# Patient Record
Sex: FEMALE | Race: WHITE | NOT HISPANIC OR LATINO | Employment: FULL TIME | URBAN - METROPOLITAN AREA
[De-identification: names, ages, dates, MRNs, and addresses within clinical notes are randomized per-mention and may not be internally consistent; named-entity substitution may affect disease eponyms.]

---

## 2017-08-16 ENCOUNTER — RECORDS - HEALTHEAST (OUTPATIENT)
Dept: LAB | Facility: CLINIC | Age: 47
End: 2017-08-16

## 2017-08-16 LAB
CHOLEST SERPL-MCNC: 220 MG/DL
FASTING STATUS PATIENT QL REPORTED: NO
HDLC SERPL-MCNC: 46 MG/DL
LDLC SERPL CALC-MCNC: 158 MG/DL
TRIGL SERPL-MCNC: 81 MG/DL

## 2017-08-21 LAB
HPV INTERPRETATION - HISTORICAL: NORMAL
HPV INTERPRETER - HISTORICAL: NORMAL

## 2017-08-24 LAB

## 2018-01-17 ENCOUNTER — RECORDS - HEALTHEAST (OUTPATIENT)
Dept: ADMINISTRATIVE | Facility: OTHER | Age: 48
End: 2018-01-17

## 2018-03-02 ENCOUNTER — RECORDS - HEALTHEAST (OUTPATIENT)
Dept: LAB | Facility: CLINIC | Age: 48
End: 2018-03-02

## 2018-03-02 LAB — TSH SERPL DL<=0.005 MIU/L-ACNC: 9.99 UIU/ML (ref 0.3–5)

## 2018-04-20 ENCOUNTER — RECORDS - HEALTHEAST (OUTPATIENT)
Dept: LAB | Facility: CLINIC | Age: 48
End: 2018-04-20

## 2018-04-20 LAB
T4 FREE SERPL-MCNC: 1.1 NG/DL (ref 0.7–1.8)
TSH SERPL DL<=0.005 MIU/L-ACNC: 7.68 UIU/ML (ref 0.3–5)

## 2018-05-09 ENCOUNTER — RECORDS - HEALTHEAST (OUTPATIENT)
Dept: LAB | Facility: CLINIC | Age: 48
End: 2018-05-09

## 2018-05-09 LAB
ANION GAP SERPL CALCULATED.3IONS-SCNC: 13 MMOL/L (ref 5–18)
BUN SERPL-MCNC: 16 MG/DL (ref 8–22)
C REACTIVE PROTEIN LHE: 1.6 MG/DL (ref 0–0.8)
CALCIUM SERPL-MCNC: 9.6 MG/DL (ref 8.5–10.5)
CHLORIDE BLD-SCNC: 105 MMOL/L (ref 98–107)
CK SERPL-CCNC: 64 U/L (ref 30–190)
CO2 SERPL-SCNC: 19 MMOL/L (ref 22–31)
CREAT SERPL-MCNC: 0.64 MG/DL (ref 0.6–1.1)
ERYTHROCYTE [SEDIMENTATION RATE] IN BLOOD BY WESTERGREN METHOD: 48 MM/HR (ref 0–20)
GFR SERPL CREATININE-BSD FRML MDRD: >60 ML/MIN/1.73M2
GLUCOSE BLD-MCNC: 81 MG/DL (ref 70–125)
POTASSIUM BLD-SCNC: 4.7 MMOL/L (ref 3.5–5)
RHEUMATOID FACT SERPL-ACNC: <15 IU/ML (ref 0–30)
SODIUM SERPL-SCNC: 137 MMOL/L (ref 136–145)

## 2018-05-10 LAB — ANA SER QL: 0.7 U

## 2018-05-29 ENCOUNTER — RECORDS - HEALTHEAST (OUTPATIENT)
Dept: LAB | Facility: CLINIC | Age: 48
End: 2018-05-29

## 2018-05-30 LAB — TSH SERPL DL<=0.005 MIU/L-ACNC: 0.54 UIU/ML (ref 0.3–5)

## 2019-01-11 ENCOUNTER — RECORDS - HEALTHEAST (OUTPATIENT)
Dept: LAB | Facility: CLINIC | Age: 49
End: 2019-01-11

## 2019-01-11 LAB
T4 FREE SERPL-MCNC: 1.3 NG/DL (ref 0.7–1.8)
TSH SERPL DL<=0.005 MIU/L-ACNC: 0.11 UIU/ML (ref 0.3–5)

## 2019-03-21 ENCOUNTER — RECORDS - HEALTHEAST (OUTPATIENT)
Dept: LAB | Facility: CLINIC | Age: 49
End: 2019-03-21

## 2019-03-22 LAB
T4 FREE SERPL-MCNC: 1.3 NG/DL (ref 0.7–1.8)
TSH SERPL DL<=0.005 MIU/L-ACNC: 0.12 UIU/ML (ref 0.3–5)

## 2019-05-17 ENCOUNTER — RECORDS - HEALTHEAST (OUTPATIENT)
Dept: LAB | Facility: CLINIC | Age: 49
End: 2019-05-17

## 2019-05-17 LAB
T4 FREE SERPL-MCNC: 1.3 NG/DL (ref 0.7–1.8)
TSH SERPL DL<=0.005 MIU/L-ACNC: 0.27 UIU/ML (ref 0.3–5)

## 2019-09-04 ENCOUNTER — RECORDS - HEALTHEAST (OUTPATIENT)
Dept: LAB | Facility: CLINIC | Age: 49
End: 2019-09-04

## 2019-09-04 LAB — TSH SERPL DL<=0.005 MIU/L-ACNC: 0.57 UIU/ML (ref 0.3–5)

## 2020-02-26 ENCOUNTER — RECORDS - HEALTHEAST (OUTPATIENT)
Dept: LAB | Facility: CLINIC | Age: 50
End: 2020-02-26

## 2020-02-26 LAB
CHOLEST SERPL-MCNC: 211 MG/DL
FASTING STATUS PATIENT QL REPORTED: ABNORMAL
HDLC SERPL-MCNC: 55 MG/DL
LDLC SERPL CALC-MCNC: 139 MG/DL
TRIGL SERPL-MCNC: 84 MG/DL
TSH SERPL DL<=0.005 MIU/L-ACNC: 2.17 UIU/ML (ref 0.3–5)

## 2020-09-30 ENCOUNTER — RECORDS - HEALTHEAST (OUTPATIENT)
Dept: ADMINISTRATIVE | Facility: OTHER | Age: 50
End: 2020-09-30

## 2020-10-02 ENCOUNTER — AMBULATORY - HEALTHEAST (OUTPATIENT)
Dept: ADMINISTRATIVE | Facility: CLINIC | Age: 50
End: 2020-10-02

## 2020-10-02 DIAGNOSIS — E66.01 MORBID OBESITY (H): ICD-10-CM

## 2020-10-02 DIAGNOSIS — E03.9 HYPOTHYROIDISM: ICD-10-CM

## 2020-10-27 ENCOUNTER — AMBULATORY - HEALTHEAST (OUTPATIENT)
Dept: SURGERY | Facility: CLINIC | Age: 50
End: 2020-10-27

## 2020-10-27 RX ORDER — LEVOTHYROXINE SODIUM 150 UG/1
112 TABLET ORAL DAILY
Status: SHIPPED | COMMUNITY
Start: 2020-10-27

## 2020-10-28 ENCOUNTER — AMBULATORY - HEALTHEAST (OUTPATIENT)
Dept: LAB | Facility: CLINIC | Age: 50
End: 2020-10-28

## 2020-10-28 ENCOUNTER — OFFICE VISIT - HEALTHEAST (OUTPATIENT)
Dept: SURGERY | Facility: CLINIC | Age: 50
End: 2020-10-28

## 2020-10-28 DIAGNOSIS — E66.01 MORBID OBESITY (H): ICD-10-CM

## 2020-10-28 DIAGNOSIS — Q89.3 HETEROTAXY SYNDROME: ICD-10-CM

## 2020-10-28 DIAGNOSIS — E03.8 OTHER SPECIFIED HYPOTHYROIDISM: ICD-10-CM

## 2020-10-28 LAB
ALBUMIN SERPL-MCNC: 3.4 G/DL (ref 3.5–5)
ALP SERPL-CCNC: 67 U/L (ref 45–120)
ALT SERPL W P-5'-P-CCNC: 13 U/L (ref 0–45)
ANION GAP SERPL CALCULATED.3IONS-SCNC: 8 MMOL/L (ref 5–18)
AST SERPL W P-5'-P-CCNC: 12 U/L (ref 0–40)
BILIRUB SERPL-MCNC: 0.2 MG/DL (ref 0–1)
BUN SERPL-MCNC: 10 MG/DL (ref 8–22)
CALCIUM SERPL-MCNC: 8.9 MG/DL (ref 8.5–10.5)
CHLORIDE BLD-SCNC: 106 MMOL/L (ref 98–107)
CO2 SERPL-SCNC: 23 MMOL/L (ref 22–31)
CREAT SERPL-MCNC: 0.63 MG/DL (ref 0.6–1.1)
ERYTHROCYTE [DISTWIDTH] IN BLOOD BY AUTOMATED COUNT: 12.7 % (ref 11–14.5)
FERRITIN SERPL-MCNC: 10 NG/ML (ref 10–130)
FOLATE SERPL-MCNC: 17.4 NG/ML
GFR SERPL CREATININE-BSD FRML MDRD: >60 ML/MIN/1.73M2
GLUCOSE BLD-MCNC: 92 MG/DL (ref 70–125)
HBA1C MFR BLD: 5.5 %
HCT VFR BLD AUTO: 36.5 % (ref 35–47)
HGB BLD-MCNC: 12 G/DL (ref 12–16)
MCH RBC QN AUTO: 26.4 PG (ref 27–34)
MCHC RBC AUTO-ENTMCNC: 32.9 G/DL (ref 32–36)
MCV RBC AUTO: 80 FL (ref 80–100)
PLATELET # BLD AUTO: 361 THOU/UL (ref 140–440)
PMV BLD AUTO: 9 FL (ref 7–10)
POTASSIUM BLD-SCNC: 4.4 MMOL/L (ref 3.5–5)
PROT SERPL-MCNC: 7.1 G/DL (ref 6–8)
PTH-INTACT SERPL-MCNC: 42 PG/ML (ref 10–86)
RBC # BLD AUTO: 4.55 MILL/UL (ref 3.8–5.4)
SODIUM SERPL-SCNC: 137 MMOL/L (ref 136–145)
VIT B12 SERPL-MCNC: 1005 PG/ML (ref 213–816)
WBC: 9.3 THOU/UL (ref 4–11)

## 2020-10-28 ASSESSMENT — MIFFLIN-ST. JEOR: SCORE: 1673.66

## 2020-10-29 LAB
25(OH)D3 SERPL-MCNC: 35.3 NG/ML (ref 30–80)
25(OH)D3 SERPL-MCNC: 35.3 NG/ML (ref 30–80)

## 2020-11-01 LAB — VIT B1 PYROPHOSHATE BLD-SCNC: 122 NMOL/L (ref 70–180)

## 2020-11-02 ENCOUNTER — AMBULATORY - HEALTHEAST (OUTPATIENT)
Dept: SURGERY | Facility: CLINIC | Age: 50
End: 2020-11-02

## 2020-11-03 LAB
ANNOTATION COMMENT IMP: NORMAL
VIT A SERPL-MCNC: 0.4 MG/L (ref 0.3–1.2)
VITAMIN A (RETINYL PALMITATE): <0.02 MG/L (ref 0–0.1)

## 2020-11-04 LAB — ZINC SERPL-MCNC: 52.8 UG/DL (ref 60–120)

## 2020-11-18 ENCOUNTER — OFFICE VISIT - HEALTHEAST (OUTPATIENT)
Dept: SURGERY | Facility: CLINIC | Age: 50
End: 2020-11-18

## 2020-11-18 DIAGNOSIS — E66.01 MORBID OBESITY (H): ICD-10-CM

## 2020-11-18 DIAGNOSIS — E66.01 MORBID OBESITY WITH BMI OF 40.0-44.9, ADULT (H): ICD-10-CM

## 2020-11-27 ENCOUNTER — RECORDS - HEALTHEAST (OUTPATIENT)
Dept: LAB | Facility: CLINIC | Age: 50
End: 2020-11-27

## 2020-11-27 LAB — TSH SERPL DL<=0.005 MIU/L-ACNC: 3.58 UIU/ML (ref 0.3–5)

## 2020-12-02 ENCOUNTER — OFFICE VISIT - HEALTHEAST (OUTPATIENT)
Dept: SURGERY | Facility: CLINIC | Age: 50
End: 2020-12-02

## 2020-12-02 DIAGNOSIS — E66.01 MORBID OBESITY (H): ICD-10-CM

## 2020-12-30 ENCOUNTER — OFFICE VISIT - HEALTHEAST (OUTPATIENT)
Dept: SURGERY | Facility: CLINIC | Age: 50
End: 2020-12-30

## 2020-12-30 DIAGNOSIS — E66.9 OBESITY (BMI 30-39.9): ICD-10-CM

## 2020-12-30 DIAGNOSIS — E66.01 MORBID OBESITY (H): ICD-10-CM

## 2020-12-30 ASSESSMENT — MIFFLIN-ST. JEOR: SCORE: 1652.35

## 2021-01-02 ENCOUNTER — COMMUNICATION - HEALTHEAST (OUTPATIENT)
Dept: SURGERY | Facility: CLINIC | Age: 51
End: 2021-01-02

## 2021-01-20 ENCOUNTER — COMMUNICATION - HEALTHEAST (OUTPATIENT)
Dept: SURGERY | Facility: CLINIC | Age: 51
End: 2021-01-20

## 2021-02-08 ENCOUNTER — COMMUNICATION - HEALTHEAST (OUTPATIENT)
Dept: SURGERY | Facility: CLINIC | Age: 51
End: 2021-02-08

## 2021-02-23 ENCOUNTER — COMMUNICATION - HEALTHEAST (OUTPATIENT)
Dept: SURGERY | Facility: CLINIC | Age: 51
End: 2021-02-23

## 2021-02-24 ENCOUNTER — RECORDS - HEALTHEAST (OUTPATIENT)
Dept: ADMINISTRATIVE | Facility: OTHER | Age: 51
End: 2021-02-24

## 2021-02-25 ENCOUNTER — COMMUNICATION - HEALTHEAST (OUTPATIENT)
Dept: SURGERY | Facility: CLINIC | Age: 51
End: 2021-02-25

## 2021-03-02 ENCOUNTER — COMMUNICATION - HEALTHEAST (OUTPATIENT)
Dept: SURGERY | Facility: CLINIC | Age: 51
End: 2021-03-02

## 2021-03-11 ENCOUNTER — OFFICE VISIT - HEALTHEAST (OUTPATIENT)
Dept: SURGERY | Facility: CLINIC | Age: 51
End: 2021-03-11

## 2021-03-11 ENCOUNTER — AMBULATORY - HEALTHEAST (OUTPATIENT)
Dept: SURGERY | Facility: CLINIC | Age: 51
End: 2021-03-11

## 2021-03-11 DIAGNOSIS — Q89.3 HETEROTAXY SYNDROME: ICD-10-CM

## 2021-03-11 DIAGNOSIS — E66.01 MORBID OBESITY WITH BMI OF 40.0-44.9, ADULT (H): ICD-10-CM

## 2021-03-11 ASSESSMENT — MIFFLIN-ST. JEOR: SCORE: 1625.13

## 2021-03-15 ENCOUNTER — COMMUNICATION - HEALTHEAST (OUTPATIENT)
Dept: SURGERY | Facility: CLINIC | Age: 51
End: 2021-03-15

## 2021-03-16 ENCOUNTER — COMMUNICATION - HEALTHEAST (OUTPATIENT)
Dept: SURGERY | Facility: CLINIC | Age: 51
End: 2021-03-16

## 2021-03-17 ENCOUNTER — AMBULATORY - HEALTHEAST (OUTPATIENT)
Dept: SURGERY | Facility: CLINIC | Age: 51
End: 2021-03-17

## 2021-03-17 ENCOUNTER — COMMUNICATION - HEALTHEAST (OUTPATIENT)
Dept: SURGERY | Facility: CLINIC | Age: 51
End: 2021-03-17

## 2021-03-17 DIAGNOSIS — Z11.59 ENCOUNTER FOR SCREENING FOR OTHER VIRAL DISEASES: ICD-10-CM

## 2021-03-18 ENCOUNTER — COMMUNICATION - HEALTHEAST (OUTPATIENT)
Dept: SURGERY | Facility: CLINIC | Age: 51
End: 2021-03-18

## 2021-03-23 ENCOUNTER — COMMUNICATION - HEALTHEAST (OUTPATIENT)
Dept: SURGERY | Facility: CLINIC | Age: 51
End: 2021-03-23

## 2021-03-24 ENCOUNTER — AMBULATORY - HEALTHEAST (OUTPATIENT)
Dept: SURGERY | Facility: CLINIC | Age: 51
End: 2021-03-24
Payer: COMMERCIAL

## 2021-03-24 DIAGNOSIS — K21.9 ACID REFLUX: ICD-10-CM

## 2021-03-24 DIAGNOSIS — Z98.84 STATUS POST LAPAROSCOPIC SLEEVE GASTRECTOMY: ICD-10-CM

## 2021-03-24 DIAGNOSIS — Z01.818 PRE-OPERATIVE CLEARANCE: ICD-10-CM

## 2021-03-29 ENCOUNTER — COMMUNICATION - HEALTHEAST (OUTPATIENT)
Dept: SURGERY | Facility: CLINIC | Age: 51
End: 2021-03-29

## 2021-03-31 ENCOUNTER — AMBULATORY - HEALTHEAST (OUTPATIENT)
Dept: LAB | Facility: HOSPITAL | Age: 51
End: 2021-03-31

## 2021-03-31 DIAGNOSIS — Z01.818 PRE-OPERATIVE CLEARANCE: ICD-10-CM

## 2021-03-31 LAB
ALBUMIN SERPL-MCNC: 4 G/DL (ref 3.5–5)
ALP SERPL-CCNC: 64 U/L (ref 45–120)
ALT SERPL W P-5'-P-CCNC: 13 U/L (ref 0–45)
ANION GAP SERPL CALCULATED.3IONS-SCNC: 8 MMOL/L (ref 5–18)
APTT PPP: 33 SECONDS (ref 24–37)
AST SERPL W P-5'-P-CCNC: 15 U/L (ref 0–40)
BASOPHILS # BLD AUTO: 0 THOU/UL (ref 0–0.2)
BASOPHILS NFR BLD AUTO: 0 % (ref 0–2)
BILIRUB SERPL-MCNC: 0.4 MG/DL (ref 0–1)
BUN SERPL-MCNC: 16 MG/DL (ref 8–22)
CALCIUM SERPL-MCNC: 9 MG/DL (ref 8.5–10.5)
CHLORIDE BLD-SCNC: 103 MMOL/L (ref 98–107)
CO2 SERPL-SCNC: 25 MMOL/L (ref 22–31)
CREAT SERPL-MCNC: 0.69 MG/DL (ref 0.6–1.1)
EOSINOPHIL # BLD AUTO: 0.1 THOU/UL (ref 0–0.4)
EOSINOPHIL NFR BLD AUTO: 1 % (ref 0–6)
ERYTHROCYTE [DISTWIDTH] IN BLOOD BY AUTOMATED COUNT: 15.3 % (ref 11–14.5)
GFR SERPL CREATININE-BSD FRML MDRD: >60 ML/MIN/1.73M2
GLUCOSE BLD-MCNC: 81 MG/DL (ref 70–125)
HCT VFR BLD AUTO: 38.7 % (ref 35–47)
HGB BLD-MCNC: 12.4 G/DL (ref 12–16)
IMM GRANULOCYTES # BLD: 0.1 THOU/UL
IMM GRANULOCYTES NFR BLD: 0 %
INR PPP: 1.1 (ref 0.9–1.1)
LYMPHOCYTES # BLD AUTO: 3 THOU/UL (ref 0.8–4.4)
LYMPHOCYTES NFR BLD AUTO: 23 % (ref 20–40)
MCH RBC QN AUTO: 25.7 PG (ref 27–34)
MCHC RBC AUTO-ENTMCNC: 32 G/DL (ref 32–36)
MCV RBC AUTO: 80 FL (ref 80–100)
MONOCYTES # BLD AUTO: 1 THOU/UL (ref 0–0.9)
MONOCYTES NFR BLD AUTO: 8 % (ref 2–10)
NEUTROPHILS # BLD AUTO: 8.6 THOU/UL (ref 2–7.7)
NEUTROPHILS NFR BLD AUTO: 68 % (ref 50–70)
PLATELET # BLD AUTO: 345 THOU/UL (ref 140–440)
PMV BLD AUTO: 11.6 FL (ref 8.5–12.5)
POTASSIUM BLD-SCNC: 3.7 MMOL/L (ref 3.5–5)
PROT SERPL-MCNC: 8 G/DL (ref 6–8)
RBC # BLD AUTO: 4.82 MILL/UL (ref 3.8–5.4)
SODIUM SERPL-SCNC: 136 MMOL/L (ref 136–145)
WBC: 12.7 THOU/UL (ref 4–11)

## 2021-04-01 ENCOUNTER — RECORDS - HEALTHEAST (OUTPATIENT)
Dept: ADMINISTRATIVE | Facility: OTHER | Age: 51
End: 2021-04-01

## 2021-04-01 ENCOUNTER — RECORDS - HEALTHEAST (OUTPATIENT)
Dept: LAB | Facility: CLINIC | Age: 51
End: 2021-04-01

## 2021-04-01 ENCOUNTER — COMMUNICATION - HEALTHEAST (OUTPATIENT)
Dept: SURGERY | Facility: CLINIC | Age: 51
End: 2021-04-01

## 2021-04-01 ASSESSMENT — MIFFLIN-ST. JEOR: SCORE: 1611.52

## 2021-04-02 LAB — BACTERIA SPEC CULT: NORMAL

## 2021-04-03 ENCOUNTER — RECORDS - HEALTHEAST (OUTPATIENT)
Dept: LAB | Facility: CLINIC | Age: 51
End: 2021-04-03

## 2021-04-03 LAB
SARS-COV-2 PCR COMMENT: NORMAL
SARS-COV-2 RNA SPEC QL NAA+PROBE: NEGATIVE
SARS-COV-2 VIRUS SPECIMEN SOURCE: NORMAL

## 2021-04-06 ENCOUNTER — SURGERY - HEALTHEAST (OUTPATIENT)
Dept: SURGERY | Facility: HOSPITAL | Age: 51
End: 2021-04-06

## 2021-04-06 ENCOUNTER — ANESTHESIA - HEALTHEAST (OUTPATIENT)
Dept: SURGERY | Facility: HOSPITAL | Age: 51
End: 2021-04-06

## 2021-04-06 ASSESSMENT — MIFFLIN-ST. JEOR
SCORE: 1593.38
SCORE: 1570.7

## 2021-04-08 ENCOUNTER — COMMUNICATION - HEALTHEAST (OUTPATIENT)
Dept: SURGERY | Facility: CLINIC | Age: 51
End: 2021-04-08

## 2021-04-09 ENCOUNTER — COMMUNICATION - HEALTHEAST (OUTPATIENT)
Dept: SURGERY | Facility: CLINIC | Age: 51
End: 2021-04-09

## 2021-04-13 ENCOUNTER — AMBULATORY - HEALTHEAST (OUTPATIENT)
Dept: SURGERY | Facility: CLINIC | Age: 51
End: 2021-04-13

## 2021-04-13 DIAGNOSIS — Z98.84 BARIATRIC SURGERY STATUS: ICD-10-CM

## 2021-04-13 DIAGNOSIS — Z71.3 NUTRITIONAL COUNSELING: ICD-10-CM

## 2021-04-16 ENCOUNTER — OFFICE VISIT - HEALTHEAST (OUTPATIENT)
Dept: SURGERY | Facility: CLINIC | Age: 51
End: 2021-04-16

## 2021-04-16 DIAGNOSIS — Z48.89 POSTOPERATIVE VISIT: ICD-10-CM

## 2021-04-16 ASSESSMENT — MIFFLIN-ST. JEOR: SCORE: 1543.48

## 2021-04-20 ENCOUNTER — COMMUNICATION - HEALTHEAST (OUTPATIENT)
Dept: SURGERY | Facility: CLINIC | Age: 51
End: 2021-04-20

## 2021-04-22 ENCOUNTER — AMBULATORY - HEALTHEAST (OUTPATIENT)
Dept: SURGERY | Facility: CLINIC | Age: 51
End: 2021-04-22

## 2021-05-05 ENCOUNTER — OFFICE VISIT - HEALTHEAST (OUTPATIENT)
Dept: SURGERY | Facility: CLINIC | Age: 51
End: 2021-05-05

## 2021-05-05 DIAGNOSIS — Z98.84 BARIATRIC SURGERY STATUS: ICD-10-CM

## 2021-05-05 ASSESSMENT — MIFFLIN-ST. JEOR: SCORE: 1511.73

## 2021-05-27 VITALS — HEIGHT: 64 IN | WEIGHT: 201 LBS | BODY MASS INDEX: 34.31 KG/M2

## 2021-05-29 ENCOUNTER — RECORDS - HEALTHEAST (OUTPATIENT)
Dept: ADMINISTRATIVE | Facility: CLINIC | Age: 51
End: 2021-05-29

## 2021-06-04 VITALS
WEIGHT: 236.7 LBS | DIASTOLIC BLOOD PRESSURE: 72 MMHG | SYSTOLIC BLOOD PRESSURE: 126 MMHG | BODY MASS INDEX: 40.41 KG/M2 | HEIGHT: 64 IN

## 2021-06-05 VITALS — BODY MASS INDEX: 37.39 KG/M2 | WEIGHT: 219 LBS | HEIGHT: 64 IN

## 2021-06-05 VITALS — WEIGHT: 227 LBS | BODY MASS INDEX: 38.96 KG/M2

## 2021-06-05 VITALS — BODY MASS INDEX: 38.58 KG/M2 | WEIGHT: 226 LBS | HEIGHT: 64 IN

## 2021-06-05 VITALS — WEIGHT: 208 LBS | BODY MASS INDEX: 35.51 KG/M2 | HEIGHT: 64 IN

## 2021-06-05 VITALS — WEIGHT: 232 LBS | HEIGHT: 64 IN | BODY MASS INDEX: 39.61 KG/M2

## 2021-06-12 NOTE — PROGRESS NOTES
"New Bariatric Surgery Consultation Note    10/28/2020    RE: Keena Vazquez  MR#: [id number]  : 1970      Referring provider:   BAR REFERRING PROVIDER 10/26/2020   Who referred you? Billie Hensley PA-C       Chief Complaint/Reason for visit: evaluation for possible weight loss surgery    Dear Billie Hensley PA-C,    I had the pleasure of seeing your patient, Keena Vazquez, to evaluate her obesity and consider her for possible weight loss surgery. As you know, Keena Vazquez is 50 y.o..  She has a height of Height: 5' 4\" (1.626 m)  , a weight of   Vitals:    10/28/20 0804   Weight: (!) 236 lb 11.2 oz (107.4 kg)   , and calculated Body mass index is 40.63 kg/m .        HISTORY OF PRESENT ILLNESS:  Weight Loss History Reviewed with Patient 10/26/2020   How long have you been overweight? Since late 20's to early 40's   What is the most that you have ever weighed? 235   What is the most weight you have lost? 30   I have tried the following methods to lose weight Watching portions or calories, Exercise, Weight Watchers, Atkins type diet (low carb/high protein), Pre packaged meals ex: Nutrisystem, Slimfast, Prescription Medications   I have tried the following weight loss medications? (Check all that apply) Meredia/sibutramine, Topamax/Topiramate, Phentermine/Adipex-p/Suprenza   Have you ever had weight loss surgery? 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, right-sided stomach and polysplenia     Hypothyroidism      Mitral valve regurgitation      Situs inversus abdominalis      Systolic murmur     Had " echocardiogram 2002     Vitamin D deficiency        PAST SURGICAL HISTORY:  Past Surgical History:   Procedure Laterality Date      SECTION, CLASSIC      Triplet delivery 2003     CHOLECYSTECTOMY       DILATION AND CURETTAGE, DIAGNOSTIC / THERAPEUTIC      uterine polyp     PKR laser eye surgery       uterine ablation  2015     Hx of vomiting after anesthesia    FAMILY HISTORY:   Family History   Problem Relation Age of Onset     COPD Mother      Diabetes Mother      Lung cancer Father      Anxiety disorder Sister      Depression Sister      Asperger's syndrome Son      Stroke Paternal Grandfather      Other Brother         astrocystoma     Rectal cancer Maternal Aunt      Heart disease Maternal Grandfather      Cancer Paternal Grandmother        SOCIAL HISTORY:   Social History Questions Reviewed With Patient 10/26/2020   Which best describes your employment status (select all that apply) I work full-time, I work days   If you work, 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 Yes   Who do you have in your support network that can be available to help you for the first 2 weeks after surgery? , sister, friend   Who can you count on for support throughout your weight loss surgery journey? , sister, friend   Can you afford 3 meals a day?  Yes   Can you afford 50-60 dollars a month for vitamins? Yes       HABITS:  BAR SURGERY - HABITS 10/26/2020   How often do you drink alcohol? Monthly or less   If you do drink alcohol, how many drinks might you have in a day? (one drink = 5 oz. wine, 1 can/bottle of beer, 1 shot liquor) 1 or 2   Do you currently use any of the following Nicotine products? No   Have you ever used any of the folowing nicotine products? No   Have you or are you currently using street drugs or prescription strength medication for which you do not have a prescription for? No   Do you have a history of  chemical dependency (alcohol or drug abuse)? No       PSYCHOLOGICAL HISTORY:   Psychological History Reviewed With Patient 10/26/2020   Have you ever attempted suicide? Never   Have you had thoughts of suicide in the past year? No   Have you ever been hospitalized for mental illness or a suicide attempt? Never   Do you have a history of chronic pain? Yes   Have you ever been diagnosed with fibromyalgia? Yes- neck pain, shoulder pain, hip pain, back pain, knee pain, ankle pain   Are you currently being treated for any of the following? I do not have a mental illness       ROS:  BAR NBS ROS 10/26/2020   Skin: Leg swelling   HEENT: Headaches, Dizziness/lightheadedness   Musculoskeletal: Joint Pain, Back pain   Cardiovascular: Heart murmurs   Pulmonary: Snoring, Expereince morning headaches   Gastrointestinal: Heartburn, Reflux- takes famotidine daily, she can skip it and feel fine, certain foods trigger it   Genitourinary: None of the above   Hematological: None of the above   Neurological: None of the above   Female ONLY: None of the above       EATING BEHAVIORS:  BAR SURGERY - EATING BEHAVIORS 10/26/2020   Have you or anyone else thought that you had an eating disorder? No   Do you currently binge eat (eat a large amount of food in a short time)? Yes- sweets   Are you an emotional eater? No   Do you get up to eat after falling asleep? No       EXERCISE:  BAR SURGERY - EXERCISE 10/26/2020   How often do you exercise? Never   What keeps you from being more active?  Pain, Shortness of breath- fibromyalgia       MEDICATIONS:  Current Outpatient Medications   Medication Sig Dispense Refill     calcium carbonate (CALCIUM ANTACID) 400 mg calcium (1,000 mg) Chew Chew.       cholecalciferol, vitamin D3, 50 mcg (2,000 unit) capsule Take 4,000 Units by mouth.       famotidine (PEPCID) 20 MG tablet Take 20 mg by mouth 2 (two) times a day.       fish oil-omega-3 fatty acids (FISH OIL) 300-1,000 mg capsule Take 2 g by mouth daily.        levothyroxine (SYNTHROID, LEVOTHROID) 150 MCG tablet Take 150 mcg by mouth daily.       multivitamin with minerals (THERA-M) 9 mg iron-400 mcg Tab tablet Take 1 tablet by mouth 2 (two) times a day.       No current facility-administered medications for this visit.        ALLERGIES:  No Known Allergies    LABS/IMAGING/MEDICAL RECORDS REVIEW: EGD 1/17/18 GERD with esophagitis, schatzki's ring, hiatal hernia. Dilatation performed.    PHYSICAL EXAM:  Vitals:    10/28/20 0804   BP: 126/72     General Appearance  No acute distress. Obesity: morbid  Alert: yes  Neck  Stout:   Cardiovascular  Rhythm regular  Murmur: faint, systolic  Pulmonary  Lungs clear to ascultation  Abdomen  Soft, NT no rebound or guarding  Psychiatric  Thought Content Organized  Moods stable    In summary, Keena Vazquez has morbid obesity with a body mass index of Body mass index is 40.63 kg/m . kg/m2 and the comorbidities stated above. She completed an informational seminar and is a candidate for Bariatric surgery.   Once the patient has completed the requirements in their task list and there are no further recommendations, the pt will be allowed to see the surgeon of her choice for consultation. Patient verbalizes understanding of the process to surgery and expectations for the postoperative period including the need for lifelong lifestyle changes, vitamin supplementation, and laboratory monitoring.  Sincerely,     Jessie Graham MD    I spent a total of 60 minutes face to face with the patient during today's office visit. Over 50% of this time was spent counseling the patient and/or coordinating care.

## 2021-06-12 NOTE — PROGRESS NOTES
I have submitted a referral for this patient to participate in the phone program for [facility]

## 2021-06-12 NOTE — PATIENT INSTRUCTIONS - HE
Before being submitted for insurance approval, you will need the following:    -Clearance by the Psychologist  -Clearance by the dietitian  -Attend Support Group ([address] [place] [address] at [facility]) 2nd Tuesday of the month 6:30-8pm. Make sure to sign in.  -Routine Health Care Maintenance must be up to date (mammograms yearly after age 40, paps as recommended by your primary provider,  colonoscopy after age 50, earlier if high risk.  -If you are on estrogen-estrogen will be discontinued one month prior to surgery. It may be resumed one month after surgery unless otherwise advised.  -Pre Operative Lab work-ordered today  -Structured weight loss visits IF mandated by your insurance carrier  -Surgeon consult  -You will need to be using CPAP for at least one month before surgery if you have sleep apnea. Make sure to bring your CPAP or BiPAP to the hospital at the time of surgery.  -You will need to be tobacco free for 2 months before surgery and remain a non-smoker thereafter. If you are currently smoking or have recently quit, your urine will be evaluated for tobacco metabolites pre-operatively.  -If you are on insulin, you might be referred to an endocrinologist who will manage your insulin during the liquid diet and around the time of surgery. This endocrinologist does not replace your primary provider or your endocrinologist.   -You will need an exercise plan which includes MOVE, ie., walking and MUSCLE, ie.,calisthenics, bands, weight, machines, etc...  ______________________________________________________________________    Remember that after your bariatric surgery, vitamin supplementation is a lifelong need.    You will take:    B-12 1000mcg or higher sublingual (under the tongue) daily or by injection 1-2X/month  D3 5000U daily   Multivitamin containing 18mg of iron twice a day  Calcium citrate 1 or 2 daily    To keep your weight off and your vitamin levels up, follow-up is important.    Your labs will be  monitored every 6 months for the first two years (every 3 months if you had a duodenal switch) and yearly thereafter.    To avoid ulcers in your stomach avoid tobacco forever, alcohol in excess, caffeine in excess and anti-inflammatories (NSAIDS)  (Aspirin, Ibuprofen, Naproxen and similar medications). Tylenol is fine.  If you are told by your physician take Aspirin to protect your heart or for another reason, make sure to take omeprazole or similar medication (protonix, nexium, prevacid) to protect your stomach.    Remember that alcohol affects you differently after bariatric surgery. If you have even ONE drink DO NOT DRIVE.       Thank you for your interest in Support Group!  We currently have two options for support group but they are in the virtual format only at this time.  Both groups are using Microsoft Teams for their platform and you can access it through the web or an zeke that can be downloaded to a smart phone if you have one.      The Pre- and Post-op Connections Group is on the second Tuesday of the month from 6:30-8pm and is hosted by Cal Davis, PhD.  If you are interested in this group, you will need to email him at [email or web address] each month and then he will in turn send you the invitation to join.      We also have a Post-op focused Connections Group the 4th Wednesday of the month from 11am-12pm that is mostly geared toward post-operative patients who are past three months post-op.  This group is hosted by CJ Matthews, CBN, CIC and you can email her to join the group at [email or web address] each month and she will send you an invite similar to Cal's group.  If you decide you would like to be a regular attender at this group, we can add you to an automatic invitation list of people.    Please let us know if you have any questions.     Thank you,   [facility] Bariatric Team

## 2021-06-13 NOTE — PROGRESS NOTES
Patient was seen through [email or web address] telehealth platform for the first of at least 2 sessions. A full report will follow.

## 2021-06-13 NOTE — PROGRESS NOTES
"Keena Vazquez is a 50 y.o. female who is being evaluated via a billable video visit.      The patient has been notified of following:     \"This video visit will be conducted via a call between you and your physician/provider. We have found that certain health care needs can be provided without the need for an in-person physical exam.  This service lets us provide the care you need with a video conversation.  If a prescription is necessary we can send it directly to your pharmacy.  If lab work is needed we can place an order for that and you can then stop by our lab to have the test done at a later time.    Video visits are billed at different rates depending on your insurance coverage. Please reach out to your insurance provider with any questions.    If during the course of the call the physician/provider feels a video visit is not appropriate, you will not be charged for this service.\"    Patient has given verbal consent to a Video visit? Yes     Video-Visit Details    Type of service:  Video Visit    Video End Time (time video stopped): 3:08 PM  Originating Location (pt. Location): Home    Distant Location (provider location):  [facility] [place]     Platform used for Video Visit: [email or web address]      Cal Davis, Ph.D,    Health and Behavior Assessment with Intervention, Follow up (60 minutes): Met with patient 1:1 to review support system, psychological testing and mindful eating strategies.  Keena has made some changes in her eating and lifestyle but still needs to be more mindful about her eating.  She will follow-up with a list of activities and mindful eating strategies.  Diagnoses: F 50.9; E 66.01    "

## 2021-06-13 NOTE — PROGRESS NOTES
"     Keena Vazquez is a 50 y.o. female who is being evaluated via a billable video visit.      The patient has been notified of following:     \"This video visit will be conducted via a call between you and your physician/provider. We have found that certain health care needs can be provided without the need for a physical exam.  This service lets us provide the care you need with a short conversation.  If a prescription is necessary we can send it directly to your pharmacy.  If lab work is needed we can place an order for that and you can then stop by our lab to have the test done at a later time.    If during the course of the visit the physician/provider feels an appointment is not appropriate, you will not be charged for this service.\"     Keena Vazquez complains of   Chief Complaint   Patient presents with     Nutrition Counseling       I have reviewed and updated the patient's Past Medical History, Social History, Family History and Medication List.    ALLERGIES  Patient has no known allergies.    Additional provider notes:      Initial Structured Weight Loss Supervised Diet Evaluation     Assessment:  Keena is being seen today for initial RD nutritional evaluation. Patient has been unsuccessful with non-surgical weight loss methods and is interested in bariatric surgery. Today we reviewed current eating habits and level of physical activity, and instructed on the changes that are required for successful bariatric outcomes.    Surgery of interest per pt: LSG.    Workflow review:  Support Group: Not completed.  Psychology:In progress.  Lab work:Completed.  SWL:No     Weight goal: At or below initial.    Anthropometrics:  Pt's Weight: (!) 236 lb 11.2 oz (107.4 kg)    BMI: There is no height or weight on file to calculate BMI.   Patient weight not recorded  Estimated RMR ([place]-St Jeor equation):  1678 kcals x 1.2 (sedentary) = 2013 kcals (for weight maintenance)    Medical History:  Patient Active " "Problem List   Diagnosis     Heterotaxy syndrome     Morbid obesity (H)     Mitral valve regurgitation     Other specified hypothyroidism      Diabetes: No  HbA1c:    Hemoglobin A1c   Date/Time Value Ref Range Status   10/28/2020 09:37 AM 5.5 <=5.6 % Final     Comment:     Normal <5.7% Prediabete 5.7-6.4% Diabletes 6.5% or higher - adopted from ADA consensus guidelines       Nutrition History  Food allergies/intolerances/cultural or religous food customs: No; but get bloated feeling from pasta; does not like onion or liver  Diet history: Watching portions or calories, Exercise, Weight Watchers, Atkins type diet (low carb/high protein), Pre packaged meals ex: Nutrisystem, Slimfast, Prescription Medications  Vitamins/Mineral currently taking: Multivitamin, Fish Oil/Omega 3 FA, Vitamin D3 2000 international unit(s), Calcium carbonate  Socioeconomic Status  Who does the grocery shopping for your household? Self, sometimes  goes  Where do you grocery shop?: [employer]  Utilizing food bank, food stamps, and/or meal delivery program(s)?: No  Who prepares your meals at home? Self    Diet Recall/Time  States that she doesn't have the best routine for preparing meals. Often is just cooking for her self. She cannot stand for long until her back starts hurting.  Breakfast: none or bowl of cereal  Lunch: [employer] or [employer] - but she is trying to be more mindful of her budget/finances. Meat and cheese, banana  Dinner: Ramen with chicken and peas (added)  Typical Snacks: String cheese, almonds, chocolate   Eating out: 3-4x per week from [employer], she eats out more when she is unable to go get groceries.  Beverages  Milk in cereal, plus glass or 2 during the day  Water  Bubly carbonated water on weekend    Doesn't drink juice, sodas, or caffeine  Caffeine gives her headaches, so she avoids it    Exercise  \"Would like to (exercise)\" - has a lot a of pain    Nutrition Diagnosis (PES statement)  (NI-1.3) " Excessive energy intake related to Food and nutrition related knowledge deficit concerning excessive energy/oral intake as evidenced by Intake of high caloric density foods/beverages (juice, soda, alcohol) at meals and/or snacks; large portions; frequent grazing; Estimated intake that exceeds estimated daily energy intake; BMI 40.6.     Intervention    Nutrition Education:   1. Provided general overview of diet and lifestyle modifications needed to be a deemed a safe candidate for bariatric surgery.     Food/Nutrient Delivery:  2. Educated patient on eating three meals, with cutting out snacking.   3. Educated patient on how to complete a food journal and benefits of meal planning.   4. Discussed importance of adequate hydration after surgery, with goal of at least 64 oz of fluids/day.  5. Addressed avoiding all carbonated, caffeinated and sweetened drinks to prepare for bariatric surgery.     Nutrition Counselin. Mindful eating techniques: Encouraged slow meal pace, chewing foods to applesauce consistency for 20-30 minutes/meal.   7. Discussed  fluids 30 minutes before, during, and after meal to prevent dumping syndrome and discomfort post bariatric surgery.     Instructions/Goals:     1. Start implementing bariatric surgery lifestyle modifications.    Handouts Provided:   [facility] Weight Management Patient Handbook    Monitor/Evaluation:  Pt. s target weight: no gain from initial visit, patient verbalized understanding.     Plan for next visit:   Review Bariatric plate and food journal homework.  Educate on dumping syndrome and reading food labels.  (Final Supervised Diet visit with RD) pre/post-op  diet progression, give review of surgery process.    Assessment/Plan:  1. Morbid obesity with BMI of 40.0-44.9, adult (H)    Video call duration: 30 minutes    Dariana Blank RD

## 2021-06-14 NOTE — PROGRESS NOTES
"   Keena Vazquez is a 50 y.o. female who is being evaluated via a billable video visit.       The patient has been notified of following:     \"This video visit will be conducted via a call between you and your physician/provider. We have found that certain health care needs can be provided without the need for an in-person physical exam.  This service lets us provide the care you need with a video conversation.  If a prescription is necessary we can send it directly to your pharmacy.  If lab work is needed we can place an order for that and you can then stop by our lab to have the test done at a later time.    Video visits are billed at different rates depending on your insurance coverage. Please reach out to your insurance provider with any questions.    If during the course of the call the physician/provider feels a video visit is not appropriate, you will not be charged for this service.\"    Patient has given verbal consent to a Video visit? Yes  How would you like to obtain your AVS? AVS Preference: MyChart.  If dropped by the video visit, the video invitation should be sent to: Send to e-mail at: [email or web address]  Will anyone else be joining your video visit? No        Video Start Time: 1:53 pm    Follow Up Surgical Weight Loss Supervised Diet Evaluation    Assessment:  Pt. is being seen today for a follow up RD nutritional evaluation. Pt. has been unsuccessful with non-surgical weight loss methods and is interested in bariatric surgery. Today we reviewed current eating habits and level of physical activity, and instructed on the changes that are required for successful bariatric outcomes.    Surgery of interest per pt: LSG.    Workflow review:  Support Group: Not completed.  Psychology:In progress.  Lab work:Completed.  SWL:No    Weight goal: At or below initial.    Anthropometrics:  Pt's No data recorded  BMI: There is no height or weight on file to calculate BMI.   Patient weight not " recorded  Patient weight not recorded  Estimated RMR (Irving-St Jeor equation):  1678 kcals x 1.2 (sedentary) = 2013 kcals (for weight maintenance)    Medical History:  Patient Active Problem List   Diagnosis     Heterotaxy syndrome     Morbid obesity (H)     Mitral valve regurgitation     Other specified hypothyroidism      Diabetes: No  HbA1c:    Hemoglobin A1c   Date/Time Value Ref Range Status   10/28/2020 09:37 AM 5.5 <=5.6 % Final     Comment:     Normal <5.7% Prediabete 5.7-6.4% Diabletes 6.5% or higher - adopted from ADA consensus guidelines     Progress over past month: Patient has done well with fully implementing bariatric surgery lifestyle changes.    Diet Recall/Time  Breakfast: [employer] Breakfast sandwich on english muffin  Lunch: Salad or lean cuisine or other healthier frozen meal  Dinner: Roasted chicken, potatoes, carrots  Typical Snacks: Trying to avoid, but if she has one she will have some fruit or string cheese  Eating out: Less often now than last month  Meal duration: 30 minutes.    fluids by 30 minutes before, during meal, and waiting 30 minutes after meal before drinking fluids: Yes  Beverages  Water: 20 oz x 3 per day    Exercise  Still having significant pain - ADLs only    PES statement:   (NI-1.3) Excessive energy intake related to Food and nutrition related knowledge deficit concerning excessive energy/oral intake as evidenced by Intake of high caloric density foods/beverages (juice, soda, alcohol) at meals and/or snacks; large portions; frequent grazing; Estimated intake that exceeds estimated daily energy intake; Binge eating patterns; Frequent excessive fast food or restaurant intake; and BMI 39.8     Intervention    Nutrition Education:   1. Provided general overview of diet and lifestyle modifications needed to be a deemed a safe candidate for bariatric surgery.   2. Educated patient on how to read a food label: choosing foods with than 10 grams fat and 10 grams sugar  per serving to avoid dumping syndrome.  3. Dumping Syndrome: Described the mechanisms of syndrome, symptoms, and prevention tools from a dietary perspective.   4. Vitamins: Educated on post-op vitamin regimen including MVI+ 18 mg Fe two times a day, calcium citrate 400-600 mg two times a day, 9545-1872 mcg sublingual B12 daily, 5000 IU vitamin D3 daily.     Food/Nutrient Delivery:  5. Educated patient on eating three meals, with cutting out snacking.  6. Bariatric Plate: Patient and I discussed the importance of including a lean protein source (20-30 grams/meal), vegetables (included at lunch and dinner), one serving (15g) of carbohydrate, and limited added fat (1 tb/day) at each meal.   7. Discussed importance of adequate hydration after surgery, with goal of at least 64 oz of fluids/day.  8. Addressed avoiding all carbonated, caffeinated and sweetened drinks to prepare for bariatric surgery.     Nutrition Counselin. Mindful eating techniques: Encouraged slow meal pace, chewing foods to applesauce consistency for 20-30 minutes/meal.   10. Discussed  fluids 30 minutes before, during, and after meal to prevent dumping syndrome and discomfort post bariatric surgery.   11. Discussed pre/post operative diet progression, post op vitamin regimen, gave review of surgery process.     Instructions/Goals:     1. Continue implementing bariatric surgery lifestyle modifications.    Handouts Provided:   [facility] Weight Management Patient Handbook    Monitor/Evaluation:  Pt. s target weight: no gain from initial visit, pt. verbalized understanding.     Pt has completed all nutrition requirements and is well-informed of the dietary and physical activity requirements that are necessary for successful bariatric outcomes. This pt is an appropriate candidate for surgery from a nutrition standpoint at this time. The patient understands that surgery is a tool, not a cure, and post operative follow up is  essential.    Video-Visit Details    Type of service:  Video Visit    Video End Time (time video stopped): 2:16 pm  Originating Location (pt. Location): Home    Distant Location (provider location):  [facility] GENERAL SURGERY AND BARIATRICS CARE     Platform used for Video Visit: Tha Blank RD

## 2021-06-14 NOTE — PROGRESS NOTES
Tele-Visit Details    Type of service:  Video Visit    Video Start Time (time video started): 3:00 PM    Video End Time (time video stopped): 3:25 PM    Originating Location (pt. Location): Patient Home    Distant Location (provider location): Home office    Mode of Communication:  Video Conference via [email or web address]    Physician has received verbal consent for a video visit from the patient? Yes      Cal Davis    Patient is ready to follow through with surgery. A report was sent to Jennifer Goodman.

## 2021-06-15 NOTE — PROGRESS NOTES
Keena Vazquez is 51 y.o.  female who presents for a billable video visit today.    How would you like to obtain your AVS? MyChart.  If dropped from the video visit, the video invitation should be resent by: Text to cell phone: [phone]  Will anyone else be joining your video visit? No      Video Start Time: 2:30 pm    Provider Notes:   HPI: Keena Vazquez is a 51 y.o. female here today for consideration of metabolic and bariatric surgery. She is referred by Billie Hensley.  She has struggled with obesity for most of her adult life, noting over the past 2 decades countless attempts at weight loss with a multitude of diets including weight watchers, Atkins diet, Nutrisystem, prescription medication such as Meridia, Topamax, and phentermine.  While she has had some success with these efforts over the years, losing up to 30 pounds, she typically would gain the weight back and ultimately reached a maximum weight of 235 pounds, from which she has managed to get back down to 225 pounds since starting work with our program in October.  She wishes to pursue bariatric surgery at this point due to a desire to achieve meaningful long-term sustained weight loss.  She hopes to be more active and have less physical restriction following bariatric surgery..     Her bariatric comorbidities include a myriad of joint pains including low back, knees and hips.    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    Past Medical History:   Diagnosis Date     Acute bilateral low back pain without sciatica      Anemia      BMI 39.0-39.9,adult      Gallbladder polyp      Heterotaxy syndrome     midline positioning of the liver, right-sided stomach and polysplenia     Hypothyroidism      Mitral valve regurgitation      Situs inversus abdominalis      Systolic murmur     Had echocardiogram 2002     Vitamin D deficiency        Past Surgical History:  "  Procedure Laterality Date      SECTION, CLASSIC      Triplet delivery      CHOLECYSTECTOMY       DILATION AND CURETTAGE, DIAGNOSTIC / THERAPEUTIC      uterine polyp     PKR laser eye surgery       uterine ablation  2015       CURRENT MEDS:  Current Outpatient Medications   Medication Sig Dispense Refill     calcium carbonate (CALCIUM ANTACID) 400 mg calcium (1,000 mg) Chew Chew.       cholecalciferol, vitamin D3, 50 mcg (2,000 unit) capsule Take 4,000 Units by mouth.       fish oil-omega-3 fatty acids (FISH OIL) 300-1,000 mg capsule Take 2 g by mouth daily.       levothyroxine (SYNTHROID, LEVOTHROID) 150 MCG tablet Take 150 mcg by mouth daily.       multivitamin with minerals (THERA-M) 9 mg iron-400 mcg Tab tablet Take 1 tablet by mouth 2 (two) times a day.       famotidine (PEPCID) 20 MG tablet Take 20 mg by mouth 2 (two) times a day.       No current facility-administered medications for this visit.          Family History   Problem Relation Age of Onset     COPD Mother      Diabetes Mother      Lung cancer Father      Anxiety disorder Sister      Depression Sister      Asperger's syndrome Son      Stroke Paternal Grandfather      Other Brother         astrocystoma     Rectal cancer Maternal Aunt      Heart disease Maternal Grandfather      Cancer Paternal Grandmother         reports that she has never smoked. She has never used smokeless tobacco. She reports previous alcohol use.    Review of Systems -  A complete ROS was reviewed and except for what is listed in the HPI above, all others are negative  PSYCHIATRIC: She has undergone a lifestyle assessment and has been deemed a good candidate for bariatric surgery by the psychologist.    Ht 5' 4\" (1.626 m)   Wt (!) 226 lb (102.5 kg)   BMI 38.79 kg/m    Wt Readings from Last 3 Encounters:   21 (!) 226 lb (102.5 kg)   20 (!) 232 lb (105.2 kg)   10/28/20 (!) 236 lb 11.2 oz (107.4 kg)     Body mass index is 38.79 kg/m .    EXAM:  GENERAL: " This is a well-developed 51 y.o. female who appears her stated age  HEAD & NECK: Grossly normal.  No visible goiter or scars  CARDIAC: Unable to assess given video visit  CHEST/LUNG: Normal respiratory effort  ABDOMEN: Obese.  No visible hernias or masses appreciated.  LYMPHATIC:  No significant adenopathy visualized.    EXTREMITIES: Grossly normal.  No evidence of chronic venous stasis.    NEUROLOGIC: Focally intact  INTEGUMENT: No open lesions or ulcers appreciated visually  PSYCHIATRIC: Normal affect. She has a good grasp on the nature of her obesity and the treatment options.    LABS:  Lab Results   Component Value Date    WBC 9.3 10/28/2020    HGB 12.0 10/28/2020    HCT 36.5 10/28/2020    MCV 80 10/28/2020     10/28/2020     INR/Prothrombin Time      Lab Results   Component Value Date    HGBA1C 5.5 10/28/2020     Lab Results   Component Value Date    ALT 13 10/28/2020    AST 12 10/28/2020    ALKPHOS 67 10/28/2020    BILITOT 0.2 10/28/2020       Assessment/Plan: 51 y.o. female who is an excellent candidate for bariatric and metabolic surgery.  After a careful conversation with the patient it was decided that a laparoscopic sleeve gastrectomy would be her best option due in part to her atypical abdominal anatomy, lack of significant metabolic disease, and BMI of 40..       I went over the surgery in detail with her.  I went over the nature of the operation and some of the potential consequences of the surgery.  I went over the expected hospital course and discussed laparoscopic versus open surgery, understanding that we will plan on doing this laparoscopically with the possibility of having to convert to an open operation.  I went over some of the risks and complications of the operation including, but not limited to, DVT, pulmonary emboli, pneumonia, postoperative bleeding, wound infection, staple line leak, intra-abdominal sepsis, and possible death.  I also went over some of the potential nutritional  concerns such as vitamin B-12, iron, vitamin D, vitamin A, calcium and protein deficiencies.  I will also went over the need for lifelong nutritional surveillance.  The patient understands and wants to proceed with surgery.  We will submit for prior authorization.      Eric Lugo MD  [facility] Department of Surgery      Video-Visit Details    Type of service:  Video Visit    Video End Time (time video stopped): 1450  Originating Location (pt. Location): Home    Distant Location (provider location):  [facility] SURGERY CLINIC AND BARIATRICS CARE [place]     Platform used for Video Visit: [employer]

## 2021-06-16 PROBLEM — E66.01 MORBID OBESITY WITH BMI OF 40.0-44.9, ADULT (H): Status: ACTIVE | Noted: 2021-03-17

## 2021-06-16 PROBLEM — E66.01 MORBID OBESITY (H): Status: ACTIVE | Noted: 2020-10-28

## 2021-06-16 PROBLEM — I34.0 MITRAL VALVE REGURGITATION: Status: ACTIVE | Noted: 2020-10-28

## 2021-06-16 PROBLEM — E66.01 MORBID OBESITY DUE TO EXCESS CALORIES (H): Status: ACTIVE | Noted: 2021-04-06

## 2021-06-16 PROBLEM — Q89.3 HETEROTAXY SYNDROME: Status: ACTIVE | Noted: 2020-10-28

## 2021-06-16 PROBLEM — E03.8 OTHER SPECIFIED HYPOTHYROIDISM: Status: ACTIVE | Noted: 2020-10-28

## 2021-06-16 NOTE — PROGRESS NOTES
Time in: 10:00a   /Time out: 10:30a      Pt presents for 1 week post op dietitian follow up. Reports tolerating full liquids well. Denies n/v, constipation/diarrhea, or significant pain. Taking MVI and SL B12 appropriately. Taking 40 oz fluid/day and 40g protein shake. Educated pt on pureed and soft to bariatric regular diets. Provided grocery list and sample meal plan for each diet stage.  Pt will begin pureed diet on 4-21-21 and advance as tolerated to softs/bariatric regular on 5-13-21. Instructed pt to begin 500 mg calcium citrate BID and 5000IU Vitamin D3 on 5-13-21. Pt to follow up with RD at 3 months post op.

## 2021-06-16 NOTE — TELEPHONE ENCOUNTER
"Post-Op Phone Call  [facility]    Surgeon: Eric Lugo M.D.  Date of Surgery: 4/6/2021  Discharge Date: 4/7/2021    Date/Time Called:   Date: 4/9/2021 Time: 4:16 PM   Attempt: First    Patient unavailable, message left to call [facility] with questions/problems? No    Pain Control:  Intensity: No Pain (0)  Duration/Location/Explain:   What makes it better/worse?     Medications:  Narcotic Use - No  Drug type:   Frequency:     Non-prescription pain control: None.    Other medications currently taking: see med list.    Complete Multivitamin + Iron BID? Yes    Vitamin B12? sublingual daily    Incisions:  Drainage? clean and dry  Comment: Steri strips on.    Intake/Output:  Fluid Intake(oz/day)? 20 oz so far today.   Fluid type? water    Heartburn? No  Counseling: Omeprazole daily.  Nausea? Yes  Vomiting? No  Explain: a little nausea yesterday with drinking, none today.    Voiding Frequency? 4 or more/day     Voiding-Color/Amount? Good.    Flatus? Yes    Bowel Movement?No     Are you using your incentive spirometer? If yes, how often? 3+/day    Any fever type symptoms? No  Explain:     Walking activity?   Frequency/Type: walking around the house.    In Preparation for Surgery:  On a scale of 1-5, with 5 being the highest, how well did the pre-op class prepare you for what actually happened in the hospital? 5  If you were unable to give us a 5, what could we have done to earn a 5?     Is there anything that you wish you would have known prior to surgery that you did not know? If yes, what? No.    On a scale of 1-5, with 5 being the highest, how was the service while you were in the hospital? 4  If you were unable to give us a 5, what could we have done to earn a 5? Felt a little ignored because she's not a \"needy pt\".    Is/was there anyone in particular; nurse, aide, hospital staff, that did a great job and you would like us to recognize? If yes, whom. No  What did they do?     Would you recommend " [facility] to others? Yes  If no, can you explain why?     Would you recommend [facility] to others?Yes  If no, can you explain why?      Thank you for your time. Please do not hesitate to call us with any questions or concerns.    Call completed by:   Jacquelyn Quezada RN, CBN  [facility]  P [phone]  F [phone]

## 2021-06-16 NOTE — PROGRESS NOTES
Patient attended group class to review pre-op instructions and dietary plan for upcoming surgery.    Discussed admission process and hospital course.  Pharmacy information packet given and explained. Patient was given exercises to work on post-op for maintaining muscle mass and strengthening that was created by [employer] [facility].  Bariatric quiz given to patient for a review on their own.  Discharge instructions, information card and follow up appointments given and reviewed with pt at this time and patient verbalized understanding. She will have her H&P on 3/31/2021 with Su Reed  And BROOKS Barnett and do her  pre-op testing during that visit. Prescriptions for Omeprazole sent to the patient's pharmacy to be started after surgery.  Orders faxed to her clinic at [phone].    Ally Ji RN, CBN

## 2021-06-16 NOTE — PROGRESS NOTES
Tasklist updated.    Jacquelyn Quezada RN, CBN  [facility]  P [phone]  F [phone]

## 2021-06-16 NOTE — PROGRESS NOTES
"Keena Vazquez is 51 y.o.  female who presents for a billable video visit today.    How would you like to obtain your AVS? MyChart.  If dropped from the video visit, the video invitation should be resent by: Text to cell phone: [phone]  Will anyone else be joining your video visit? No      Video Start Time: 0915     Provider Notes:   HPI: Pt is here for follow up of a laparoscopic sleeve gastrectomy. She is doing well. Taking po well, estimating she is getting in 64 oz of fluid. No vomiting.  No pain with drinking or eating purees. No fevers or chills. Ambulating without problems.     Current Outpatient Medications   Medication Sig Dispense Refill     calcium, as carbonate, (TUMS) 200 mg calcium (500 mg) chewable tablet Chew 1 tablet 4 (four) times a day with meals and bedtime. Do not start taking after surgery until advised by dietician in post-op class.       cholecalciferol, vitamin D3, 50 mcg (2,000 unit) capsule Take 4,000 Units by mouth daily. Do not start taking after surgery until advised by dietician in post-op class.       cyanocobalamin, vitamin B-12, 1,000 mcg Subl Place 1,000 mcg under the tongue daily.       fish oil-omega-3 fatty acids (FISH OIL) 300-1,000 mg capsule Take 1 g by mouth 4 (four) times a day. Do not start taking after surgery until advised by dietician in post-op class.       levothyroxine (SYNTHROID, LEVOTHROID) 150 MCG tablet Take 150 mcg by mouth Daily at 6:00 am.        omeprazole (PRILOSEC) 20 MG capsule Take 1 capsule (20 mg total) by mouth daily before breakfast. Open capsule and sprinkle on food. 90 capsule 0     pediatric multivitamin-iron (CEROVITE JR) 18 mg iron- 10 mcg Chew chewable tablet Chew 1 tablet 2 (two) times a day.  0     No current facility-administered medications for this visit.          Ht 5' 4\" (1.626 m)   Wt 208 lb (94.3 kg)   LMP 04/06/2021   BMI 35.70 kg/m    Wt Readings from Last 3 Encounters:   04/16/21 208 lb (94.3 kg)   04/06/21 219 lb (99.3 kg) "   03/24/21 (!) 227 lb (103 kg)     Body mass index is 35.7 kg/m .    EXAM:  GENERAL:Appears well  ABDOMEN: Incisions healing well      Assessment/Plan: Pt s/p sleeve gastrectomy. Doing well. Diet and activity discussed. She will f/u with us [facility] in 1 month to meet with our dietician, and again at 3 months for additional follow up.    Eric Lugo MD, FACS  Office: [phone]  [facility]   General and Bariatric Surgery      Video-Visit Details    Type of service:  Video Visit    Video End Time (time video stopped): 9:20 AM  Originating Location (pt. Location): Home    Distant Location (provider location):  [facility] SURGERY CLINIC AND BARIATRICS CARE [place]     Platform used for Video Visit: [employer]

## 2021-06-16 NOTE — ANESTHESIA PREPROCEDURE EVALUATION
Anesthesia Evaluation      Patient summary reviewed   No history of anesthetic complications     Airway   Mallampati: II   Pulmonary - negative ROS and normal exam                          Cardiovascular - negative ROS and normal exam  Exercise tolerance: > or = 4 METS  Rhythm: regular  Rate: normal,         Neuro/Psych - negative ROS     Endo/Other    (+) hypothyroidism,      GI/Hepatic/Renal    (+) GERD well controlled,        Other findings: Results for MARIA ANTONIA NEGRETE (MRN [id number]) as of 4/6/2021 12:12    3/31/2021 14:22  INR: 1.10  PTT: 33  WBC: 12.7 (H)  RBC: 4.82  Hemoglobin: 12.4  Hematocrit: 38.7  MCV: 80  MCH: 25.7 (L)  MCHC: 32.0  RDW: 15.3 (H)  Platelets: 345      Results for MARIA ANTONIA NEGRETE (MRN [id number]) as of 4/6/2021 12:12    3/31/2021 14:22  Sodium: 136  Potassium: 3.7  Chloride: 103  CO2: 25  Anion Gap, Calculation: 8  BUN: 16  Creatinine: 0.69  GFR MDRD Af Amer: >60  GFR MDRD Non Af Amer: >60        Dental - normal exam                        Anesthesia Plan  Planned anesthetic: general endotracheal and ITN  GAETT  ITN  Ketamine after induction  Magnesium periop infusion  Antiemetics    ASA 2   Induction: intravenous   Anesthetic plan and risks discussed with: patient    Post-op plan: routine recovery

## 2021-06-16 NOTE — ANESTHESIA POSTPROCEDURE EVALUATION
Patient: Keena Vazquez  Procedure(s):  GASTRECTOMY, SLEEVE, LAPAROSCOPIC  Anesthesia type: general    Patient location: PACU  Last vitals:   Vitals Value Taken Time   /70 04/06/21 1530   Temp 36.9  C (98.5  F) 04/06/21 1505   Pulse 73 04/06/21 1532   Resp 24 04/06/21 1532   SpO2 99 % 04/06/21 1532   Vitals shown include unvalidated device data.  Post vital signs: stable  Level of consciousness: awake and responds to simple questions  Post-anesthesia pain: pain controlled  Post-anesthesia nausea and vomiting: no  Pulmonary: unassisted, return to baseline  Cardiovascular: stable and blood pressure at baseline  Hydration: adequate  Anesthetic events: no    QCDR Measures:  ASA# 11 - Patricia-op Cardiac Arrest: ASA11B - Patient did NOT experience unanticipated cardiac arrest  ASA# 12 - Patricia-op Mortality Rate: ASA12B - Patient did NOT die  ASA# 13 - PACU Re-Intubation Rate: ASA13B - Patient did NOT require a new airway mgmt  ASA# 10 - Composite Anes Safety: ASA10A - No serious adverse event    Additional Notes:

## 2021-06-16 NOTE — PROGRESS NOTES
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    Leave start date: 04/06/2021    Leave end date: 04/20/2021    RTW with no restrictions.    Forms faxed back to: Alvina Gambino, [phone].    Forms sent to HIM for scanning.    Benjamin Marrufo  [facility]  Surgery [facility]  Weight Management Clinic [address] [address]  Office: [phone]  Fax: [phone]

## 2021-06-16 NOTE — PROGRESS NOTES
" Time in: 1:00p /Time out: 2:00p   Pt's Initial Weight: 236 lbs  Weight: (!) 226 lb (102.5 kg)  Weight loss from initial: 10  % Weight loss: 4.24 %  Weight (Patient Reported): 226 lb (102.5 kg)  Height (Patient Reported): 5' 4\" (1.626 m)  BMI (Based on Pt Reported Ht/Wt): 38.79    BMI: There is no height or weight on file to calculate BMI.    Pt presents for nutrition education class for liquid diets. Educated pt on 2-week pre-op and 1-week post-op liquid diets. Discussed appropriate liquids and demonstrated portions for each of the food groupings during each diet phase. Reviewed appropriate calories/protein/fluid goals during 2-week pre-op liquid diet. Educated on correct vitamins/minerals to take after surgery in correct dosage and frequency. Provided grocery list and sample menu plan for each diet stage, as well as unflavored protein powder samples.        Pt will begin 2-week pre-op liquid diet 3/23/21, will do clear liquids the day before surgery. Pt is scheduled for LSG on 4/6/21, and will then follow 2 week post-op liquid diet. Pt will f/u with RD 1-week post-op for further diet advancement.  Dariana Blank RD  "

## 2021-06-16 NOTE — PROGRESS NOTES
I have Keena scheduled for a LSG with Dr. Lugo on 04/06/21.  Scheduled for pre op class on 03/24/21  She will need to start the liquid diet on 03/23/21.  I have reached out to the dieticians to send off the packet for patient to prepare  She will be having her pre op and physical at [facility]     # [id number]  03/11/21-03/10/22

## 2021-06-16 NOTE — ANESTHESIA CARE TRANSFER NOTE
Last vitals:   Vitals:    04/06/21 1505   BP: 170/78   Pulse: 75   Resp: 16   Temp: 36.9  C (98.5  F)   SpO2: 100%     Patient's level of consciousness is drowsy  Spontaneous respirations: yes  Maintains airway independently: yes  Dentition unchanged: yes  Oropharynx: oropharynx clear of all foreign objects    QCDR Measures:  ASA# 20 - Surgical Safety Checklist: WHO surgical safety checklist completed prior to induction    PQRS# 430 - Adult PONV Prevention: 4558F - Pt received => 2 anti-emetic agents (different classes) preop & intraop  ASA# 8 - Peds PONV Prevention: NA - Not pediatric patient, not GA or 2 or more risk factors NOT present  PQRS# 424 - Patricia-op Temp Management: 4559F - At least one body temp DOCUMENTED => 35.5C or 95.9F within required timeframe  PQRS# 426 - PACU Transfer Protocol: - Transfer of care checklist used  ASA# 14 - Acute Post-op Pain: ASA14B - Patient did NOT experience pain >= 7 out of 10

## 2021-06-16 NOTE — ANESTHESIA PROCEDURE NOTES
Spinal Block    Patient location during procedure: pre-op  Start time: 4/6/2021 1:13 PM  End time: 4/6/2021 1:15 PM  Reason for block: post-op pain management    Staffing:  Performing  Anesthesiologist: Xavier Noel MD    Preanesthetic Checklist  Completed: patient identified, risks, benefits, and alternatives discussed, timeout performed, consent obtained, airway assessed, oxygen available, suction available, emergency drugs available and hand hygiene performed  Spinal Block  Patient position: sitting  Prep: ChloraPrep  Patient monitoring: heart rate, cardiac monitor, continuous pulse ox and blood pressure  Approach: midline  Location: L3-4  Injection technique: single-shot  Needle type: pencil-tip   Needle gauge: 24 G

## 2021-06-17 NOTE — PROGRESS NOTES
LSG on 4/6/21    Pt presents for 1 month post op dietitian follow up visit. Reports tolerating soft food diet well. Denies n/v, constipation/diarrhea, or significant pain. Taking MVI and Vitamin B12 appropriately. Instructed pt to begin taking 500 mg calcium citrate BID and 5000 IU Vitamin D3. Educated pt on soft to bariatric regular diets, medications, developing an exercise regimen, and other dietary points of care. Provided  Education handout on items discussed. Pt to follow up with RD at 3 months post op.     Have you been to the hospital or seen by a doctor for any reason since your surgery? Yes    If yes:  Have you been seen in an outpatient clinic or doctors office after your surgery? Yes  If yes, was this a routine follow-up? No  If no, what was the reason for your visit? saw primary for skin fungal infection  Date of visit: 5/3/21  Have you experienced any health problems since your surgery? Skin fungal infection    Did you go to an Emergency Department or hospital after your surgery? No  Where were you admitted?  If yes, explain:  Dates of ED visit or hospitalization:     Did you have additional surgery(ies) during this hospitalization?  If yes, what type of surgery did you have?  Date of surgery:    Dariana Blank RD, LD

## 2021-06-27 ENCOUNTER — HEALTH MAINTENANCE LETTER (OUTPATIENT)
Age: 51
End: 2021-06-27

## 2021-07-07 ENCOUNTER — AMBULATORY - HEALTHEAST (OUTPATIENT)
Dept: SURGERY | Facility: CLINIC | Age: 51
End: 2021-07-07

## 2021-07-07 ENCOUNTER — COMMUNICATION - HEALTHEAST (OUTPATIENT)
Dept: SURGERY | Facility: CLINIC | Age: 51
End: 2021-07-07

## 2021-07-07 ENCOUNTER — HOSPITAL ENCOUNTER (EMERGENCY)
Dept: EMERGENCY MEDICINE | Facility: HOSPITAL | Age: 51
Discharge: HOME | End: 2021-07-07
Attending: EMERGENCY MEDICINE
Payer: COMMERCIAL

## 2021-07-07 DIAGNOSIS — R11.0 NAUSEA: ICD-10-CM

## 2021-07-07 LAB
ALBUMIN SERPL-MCNC: 3.6 G/DL (ref 3.5–5)
ALP SERPL-CCNC: 70 U/L (ref 45–120)
ALT SERPL W P-5'-P-CCNC: 10 U/L (ref 0–45)
ANION GAP SERPL CALCULATED.3IONS-SCNC: 8 MMOL/L (ref 5–18)
AST SERPL W P-5'-P-CCNC: 11 U/L (ref 0–40)
BILIRUB SERPL-MCNC: 0.5 MG/DL (ref 0–1)
BUN SERPL-MCNC: 9 MG/DL (ref 8–22)
CALCIUM SERPL-MCNC: 9.5 MG/DL (ref 8.5–10.5)
CHLORIDE BLD-SCNC: 104 MMOL/L (ref 98–107)
CO2 SERPL-SCNC: 26 MMOL/L (ref 22–31)
CREAT SERPL-MCNC: 0.84 MG/DL (ref 0.6–1.1)
GFR SERPL CREATININE-BSD FRML MDRD: >60 ML/MIN/1.73M2
GLUCOSE BLD-MCNC: 112 MG/DL (ref 70–125)
MAGNESIUM SERPL-MCNC: 1.9 MG/DL (ref 1.8–2.6)
POTASSIUM BLD-SCNC: 3.8 MMOL/L (ref 3.5–5)
PROT SERPL-MCNC: 7.8 G/DL (ref 6–8)
SODIUM SERPL-SCNC: 138 MMOL/L (ref 136–145)

## 2021-07-07 RX ORDER — ONDANSETRON 4 MG/1
4 TABLET, FILM COATED ORAL EVERY 8 HOURS PRN
Qty: 12 TABLET | Refills: 0 | Status: SHIPPED | OUTPATIENT
Start: 2021-07-07 | End: 2021-11-24

## 2021-07-07 NOTE — ED NOTES
"ED Notes by Sukhwinder Gonzales, RN at 7/7/2021  6:00 PM     Author: Sukhwinder Gonzales RN Service: -- Author Type: Registered Nurse    Filed: 7/7/2021  6:02 PM Date of Service: 7/7/2021  6:00 PM Status: Signed    : Sukhwinder Gonzales RN (Registered Nurse)       Patient returns from the bathroom, she wants her IV stopped, she does not want any IV pain medicine and just \"wants to go home\".  Dr. Duarte will be updated regarding this.       "

## 2021-07-07 NOTE — ED TRIAGE NOTES
ED Triage Notes by Slade Jones, RN at 7/7/2021  4:26 PM     Author: Slade Jones RN Service: -- Author Type: Registered Nurse    Filed: 7/7/2021  4:27 PM Date of Service: 7/7/2021  4:26 PM Status: Signed    : Slade Jones RN (Registered Nurse)       Pt diagnosed with kidney stone on L side on Sunday. Pt managing pain at home ok, but nausea persists even with prescription of Zofran. Pt denies ability to keep anything down. No other complaints. Denies fevers, denies shortness of breath, Denies CP, Denies LOC.

## 2021-07-08 NOTE — ED PROVIDER NOTES
ED Provider Notes by Yecenia Duarte DO at 2021  4:37 PM     Author: Yecenia Duarte DO Service: Emergency Medicine Author Type: Physician    Filed: 2021  9:53 PM Date of Service: 2021  4:37 PM Status: Signed    : Yecenia Duarte DO (Physician)       EMERGENCY DEPARTMENT ENCOUNTER      NAME: Keena Vazquez  AGE: 51 y.o. female  YOB: 1970  MRN: [id number]  EVALUATION DATE & TIME: 2021  4:36 PM    PCP: Billie Hensley PA-C    ED PROVIDER: Yana Duarte DO      Chief Complaint   Patient presents with   ? Nausea          FINAL IMPRESSION:  1. Nausea          ED COURSE & MEDICAL DECISION MAKIN:37 PM Met with patient for initial interview and exam. Discussed initial plan for care for their stay in the emergency department.  6:07 PM Checked in on patient. Patient states nausea has improved. She denies any pain or anxiety from pain medications. Patient states she would like to go home. We discussed plans for discharge including supportive cares, symptomatic treatment, outpatient follow up, and reasons to return to the emergency department.    The patient was interviewed and examined.  History in the chart was reviewed.  51 y.o. female presents to the Emergency Department for evaluation of nausea.  Of note, recently diagnosed nephrolithiasis 2 days ago.  She was prescribed Zofran and oxycodone.  Her pain has been mostly controlled with Tylenol and intermittent oxycodone.  Her nausea has been fairly intractable.  Despite the Zofran, difficulty keeping down fluids were food.  She has a history of gastric sleeve, notes retching.  No abdominal pain.  No fever.  She is nontoxic-appearing.  Exam is benign.  Plan for check of her electrolytes, IV fluids, Compazine.  Electrolytes are unremarkable.  Initiate IV fluids.  Given Compazine.  Nursing reports patient with increasing pain to her left flank, order Dilaudid.  Patient actually declines medication.  She requests  discharge.  I did reevaluate patient.  She reports her nausea is better.  She denied any anxiety from her Compazine.  I discussed the results with her.  Will prescribe Compazine for home.  Offered a suppository prescription, however patient declined.  Encourage close follow-up with her primary provider and urology if ongoing symptoms.    At the conclusion of the encounter I discussed  the results of all of the tests and the disposition with patient.   All questions were answered.  The patient acknowledged understanding and was involved in the decision making regarding the overall care plan.      I discussed with the patient the utility, limitations and findings of the exam/interventions/studies done during this visit as well as the list of differential diagnosis and symptoms to monitor/return to ER for.  Additional verbal discharge instructions were provided.     MEDICATIONS GIVEN IN THE EMERGENCY:  Medications   HYDROmorphone injection 0.5 mg (DILAUDID) (has no administration in time range)   lactated Ringers 1,000 mL (1,000 mL Intravenous New Bag 7/7/21 1703)   prochlorperazine injection 10 mg (COMPAZINE) (10 mg Intravenous Given 7/7/21 6)       NEW PRESCRIPTIONS STARTED AT TODAY'S ER VISIT  Current Discharge Medication List      START taking these medications    Details   ondansetron (ZOFRAN) 4 MG tablet Take 1 tablet (4 mg total) by mouth every 8 (eight) hours as needed for nausea.  Qty: 12 tablet, Refills: 0    Associated Diagnoses: Nausea         CONTINUE these medications which have NOT CHANGED    Details   calcium, as carbonate, (TUMS) 200 mg calcium (500 mg) chewable tablet Chew 1 tablet 4 (four) times a day with meals and bedtime. Do not start taking after surgery until advised by dietician in post-op class.      cholecalciferol, vitamin D3, 50 mcg (2,000 unit) capsule Take 4,000 Units by mouth daily. Do not start taking after surgery until advised by dietician in post-op class.      cyanocobalamin,  vitamin B-12, 1,000 mcg Subl Place 1,000 mcg under the tongue daily.      fish oil-omega-3 fatty acids (FISH OIL) 300-1,000 mg capsule Take 1 g by mouth 4 (four) times a day. Do not start taking after surgery until advised by dietician in post-op class.      levothyroxine (SYNTHROID, LEVOTHROID) 150 MCG tablet Take 150 mcg by mouth Daily at 6:00 am.       omeprazole (PRILOSEC) 20 MG capsule Take 1 capsule (20 mg total) by mouth daily before breakfast. Open capsule and sprinkle on food.  Qty: 90 capsule, Refills: 0    Associated Diagnoses: Acid reflux; Status post laparoscopic sleeve gastrectomy      pediatric multivitamin-iron (CEROVITE JR) 18 mg iron- 10 mcg Chew chewable tablet Chew 1 tablet 2 (two) times a day.  Refills: 0    Associated Diagnoses: S/P laparoscopic sleeve gastrectomy                =================================================================    HPI    Patient information was obtained from: Patient    Use of : N/A        Keena Vazquez is a 51 y.o. female who presents nausea.    Per chart review, patient was seen on 7/4/21 at [facility] for back pain. CT abdomen showed for 5 mm stone left UPJ with moderate left-sided hydronephrosis    Since being seen on 7/4/21, patient reports having persistent constant nausea. Patient denies having any abdominal, back, or other pains related to kidney stones. She states that she has not bee able to eat or drink much due to nausea. She endorses having dry heaves but denies vomiting due to gastric sleeve. Patient states she has been urinating well but denies having a bowel movement since 7/4/21 because she has not been eating. She mentions that she has not yet followed with urology. Patient denies fever or any other complaints at this time.     REVIEW OF SYSTEMS   Review of Systems   Constitutional: Positive for appetite change (decrease). Negative for chills, fatigue and fever.   HENT: Negative for sore throat and trouble swallowing.     Respiratory: Negative for shortness of breath.    Cardiovascular: Negative for chest pain.   Gastrointestinal: Positive for nausea. Negative for abdominal pain, diarrhea and vomiting.        Positive dry heaves and decreased bowel output   Genitourinary: Negative for difficulty urinating and dysuria.   Musculoskeletal: Negative for arthralgias and back pain.   Skin: Negative for rash.   Neurological: Negative for light-headedness, numbness and headaches.   All other systems reviewed and are negative.       PAST MEDICAL HISTORY:  Past Medical History:   Diagnosis Date   ? Acute bilateral low back pain without sciatica    ? Anemia    ? Arthritis    ? BMI 39.0-39.9,adult    ? Fibromyalgia    ? Gallbladder polyp    ? GERD (gastroesophageal reflux disease)    ? 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 Hypothyroidism    ? Mitral valve regurgitation    ? Situs inversus abdominalis    ? Systolic murmur     Had echocardiogram    ? Vitamin D deficiency        PAST SURGICAL HISTORY:  Past Surgical History:   Procedure Laterality Date   ?  SECTION, CLASSIC      Triplet delivery    ? CHOLECYSTECTOMY     ? DILATION AND CURETTAGE, DIAGNOSTIC / THERAPEUTIC      uterine polyp   ? PKR laser eye surgery     ? KY LAP, JIM RESTRICT PROC, LONGITUDINAL GASTRECTOMY N/A 2021    Procedure: GASTRECTOMY, SLEEVE, LAPAROSCOPIC;  Surgeon: Eric Lugo MD;  [facility];  Service: General   ? uterine ablation             CURRENT MEDICATIONS:    No current facility-administered medications on file prior to encounter.      Current Outpatient Medications on File Prior to Encounter   Medication Sig   ? calcium, as carbonate, (TUMS) 200 mg calcium (500 mg) chewable tablet Chew 1 tablet 4 (four) times a day with meals and bedtime. Do not start taking after surgery until advised by dietician in post-op class.   ? cholecalciferol, vitamin D3, 50 mcg  (2,000 unit) capsule Take 4,000 Units by mouth daily. Do not start taking after surgery until advised by dietician in post-op class.   ? cyanocobalamin, vitamin B-12, 1,000 mcg Subl Place 1,000 mcg under the tongue daily.   ? fish oil-omega-3 fatty acids (FISH OIL) 300-1,000 mg capsule Take 1 g by mouth 4 (four) times a day. Do not start taking after surgery until advised by dietician in post-op class.   ? levothyroxine (SYNTHROID, LEVOTHROID) 150 MCG tablet Take 150 mcg by mouth Daily at 6:00 am.    ? omeprazole (PRILOSEC) 20 MG capsule Take 1 capsule (20 mg total) by mouth daily before breakfast. Open capsule and sprinkle on food.   ? pediatric multivitamin-iron (CEROVITE JR) 18 mg iron- 10 mcg Chew chewable tablet Chew 1 tablet 2 (two) times a day.       ALLERGIES:  No Known Allergies    FAMILY HISTORY:  Family History   Problem Relation Age of Onset   ? COPD Mother    ? Diabetes Mother    ? Lung cancer Father    ? Anxiety disorder Sister    ? Depression Sister    ? Asperger's syndrome Son    ? Stroke Paternal Grandfather    ? Other Brother         astrocystoma   ? Rectal cancer Maternal Aunt    ? Heart disease Maternal Grandfather    ? Cancer Paternal Grandmother        SOCIAL HISTORY:   Social History     Socioeconomic History   ? Marital status:      Spouse name: None   ? Number of children: None   ? Years of education: None   ? Highest education level: None   Occupational History   ? None   Social Needs   ? Financial resource strain: None   ? Food insecurity     Worry: None     Inability: None   ? Transportation needs     Medical: None     Non-medical: None   Tobacco Use   ? Smoking status: Never Smoker   ? Smokeless tobacco: Never Used   Substance and Sexual Activity   ? Alcohol use: Not Currently   ? Drug use: Not Currently   ? Sexual activity: None   Lifestyle   ? Physical activity     Days per week: None     Minutes per session: None   ? Stress: None   Relationships   ? Social connections     Talks  on phone: None     Gets together: None     Attends Denominational service: None     Active member of club or organization: None     Attends meetings of clubs or organizations: None     Relationship status: None   ? Intimate partner violence     Fear of current or ex partner: None     Emotionally abused: None     Physically abused: None     Forced sexual activity: None   Other Topics Concern   ? None   Social History Narrative   ? None         PHYSICAL EXAM    VITALS  /86   Pulse (!) 54   Temp 97.5  F (36.4  C) (Temporal)   Resp 22   Wt 181 lb (82.1 kg)   LMP 04/06/2021   SpO2 100%   BMI 31.07 kg/m    Physical Exam  Vitals signs and nursing note reviewed.   Constitutional:       General: She is not in acute distress.     Appearance: Normal appearance. She is well-developed. She is not diaphoretic.   HENT:      Head: Normocephalic and atraumatic.   Eyes:      Conjunctiva/sclera: Conjunctivae normal.      Pupils: Pupils are equal, round, and reactive to light.   Neck:      Musculoskeletal: Neck supple.      Trachea: No tracheal deviation.   Cardiovascular:      Rate and Rhythm: Normal rate and regular rhythm.      Heart sounds: Normal heart sounds.   Pulmonary:      Effort: Pulmonary effort is normal. No respiratory distress.      Breath sounds: Normal breath sounds.   Abdominal:      General: Bowel sounds are normal. There is no distension.      Palpations: Abdomen is soft.      Tenderness: There is no abdominal tenderness.   Musculoskeletal: Normal range of motion.   Skin:     General: Skin is warm and dry.   Neurological:      Mental Status: She is alert.            LAB:  All pertinent labs reviewed and interpreted.  Results for orders placed or performed during the hospital encounter of 07/07/21   Comprehensive Metabolic Panel   Result Value Ref Range    Sodium 138 136 - 145 mmol/L    Potassium 3.8 3.5 - 5.0 mmol/L    Chloride 104 98 - 107 mmol/L    CO2 26 22 - 31 mmol/L    Anion Gap, Calculation 8 5 - 18  mmol/L    Glucose 112 70 - 125 mg/dL    BUN 9 8 - 22 mg/dL    Creatinine 0.84 0.60 - 1.10 mg/dL    GFR MDRD Af Amer >60 >60 mL/min/1.73m2    GFR MDRD Non Af Amer >60 >60 mL/min/1.73m2    Bilirubin, Total 0.5 0.0 - 1.0 mg/dL    Calcium 9.5 8.5 - 10.5 mg/dL    Protein, Total 7.8 6.0 - 8.0 g/dL    Albumin 3.6 3.5 - 5.0 g/dL    Alkaline Phosphatase 70 45 - 120 U/L    AST 11 0 - 40 U/L    ALT 10 0 - 45 U/L   Magnesium   Result Value Ref Range    Magnesium 1.9 1.8 - 2.6 mg/dL         I, Horace Hogue, am serving as a scribe to document services personally performed by Dr. Duarte based on my observation and the provider's statements to me. I, Yana Duarte, DO attest that Horace Hogue is acting in a scribe capacity, has observed my performance of the services and has documented them in accordance with my direction.    Yana Duarte DO  Emergency Medicine  [facility] EMERGENCY DEPARTMENT  [address]  [facility] [address]  Dept: [phone]  Loc: [phone]     Yecenia Duarte DO  07/07/21 0764

## 2021-07-09 ENCOUNTER — RECORDS - HEALTHEAST (OUTPATIENT)
Dept: RADIOLOGY | Facility: CLINIC | Age: 51
End: 2021-07-09

## 2021-07-09 DIAGNOSIS — Z11.59 ENCOUNTER FOR SCREENING FOR OTHER VIRAL DISEASES: ICD-10-CM

## 2021-07-10 VITALS — BODY MASS INDEX: 31.07 KG/M2 | WEIGHT: 181 LBS

## 2021-07-11 ENCOUNTER — LAB REQUISITION (OUTPATIENT)
Dept: LAB | Facility: CLINIC | Age: 51
End: 2021-07-11
Payer: COMMERCIAL

## 2021-07-11 DIAGNOSIS — Z01.812 ENCOUNTER FOR PREPROCEDURAL LABORATORY EXAMINATION: ICD-10-CM

## 2021-07-11 PROBLEM — N13.2 HYDRONEPHROSIS WITH URINARY OBSTRUCTION DUE TO RENAL CALCULUS: Status: ACTIVE | Noted: 2021-07-09

## 2021-07-11 PROBLEM — N23 RENAL COLIC: Status: ACTIVE | Noted: 2021-07-09

## 2021-07-11 PROBLEM — N20.0 CALCULUS OF KIDNEY: Status: ACTIVE | Noted: 2021-07-09

## 2021-07-11 PROCEDURE — U0003 INFECTIOUS AGENT DETECTION BY NUCLEIC ACID (DNA OR RNA); SEVERE ACUTE RESPIRATORY SYNDROME CORONAVIRUS 2 (SARS-COV-2) (CORONAVIRUS DISEASE [COVID-19]), AMPLIFIED PROBE TECHNIQUE, MAKING USE OF HIGH THROUGHPUT TECHNOLOGIES AS DESCRIBED BY CMS-2020-01-R: HCPCS | Mod: ORL | Performed by: FAMILY MEDICINE

## 2021-07-12 LAB — SARS-COV-2 RNA RESP QL NAA+PROBE: NEGATIVE

## 2021-07-12 RX ORDER — MULTIVITAMIN
2 TABLET,CHEWABLE ORAL
COMMUNITY

## 2021-07-12 RX ORDER — PEDI MULTIVIT 158/IRON/VIT K1 18MG-10MCG
1 TABLET,CHEWABLE ORAL
COMMUNITY
Start: 2021-04-07 | End: 2021-11-24

## 2021-07-12 RX ORDER — ONDANSETRON 4 MG/1
8 TABLET, ORALLY DISINTEGRATING ORAL
COMMUNITY
Start: 2021-07-04 | End: 2021-11-24

## 2021-07-12 RX ORDER — CALCIUM CARBONATE 500(1250)
1 TABLET,CHEWABLE ORAL
COMMUNITY

## 2021-07-13 ENCOUNTER — ANESTHESIA EVENT (OUTPATIENT)
Dept: SURGERY | Facility: AMBULATORY SURGERY CENTER | Age: 51
End: 2021-07-13
Payer: COMMERCIAL

## 2021-07-14 ENCOUNTER — ANESTHESIA (OUTPATIENT)
Dept: SURGERY | Facility: AMBULATORY SURGERY CENTER | Age: 51
End: 2021-07-14
Payer: COMMERCIAL

## 2021-07-14 ENCOUNTER — HOSPITAL ENCOUNTER (OUTPATIENT)
Facility: AMBULATORY SURGERY CENTER | Age: 51
End: 2021-07-14
Attending: UROLOGY
Payer: COMMERCIAL

## 2021-07-14 VITALS
OXYGEN SATURATION: 99 % | HEIGHT: 64 IN | DIASTOLIC BLOOD PRESSURE: 68 MMHG | TEMPERATURE: 97.9 F | HEART RATE: 74 BPM | RESPIRATION RATE: 16 BRPM | SYSTOLIC BLOOD PRESSURE: 123 MMHG | BODY MASS INDEX: 31.41 KG/M2 | WEIGHT: 184 LBS

## 2021-07-14 LAB
HCG UR QL: NEGATIVE
INTERNAL QC OK POCT: NORMAL

## 2021-07-14 PROCEDURE — 52332 CYSTOSCOPY AND TREATMENT: CPT | Mod: LT | Performed by: UROLOGY

## 2021-07-14 PROCEDURE — 52351 CYSTOURETERO & OR PYELOSCOPE: CPT | Mod: 59 | Performed by: UROLOGY

## 2021-07-14 DEVICE — URETERAL STENT
Type: IMPLANTABLE DEVICE | Site: URETER | Status: FUNCTIONAL
Brand: PERCUFLEX™ PLUS

## 2021-07-14 RX ORDER — SODIUM CHLORIDE, SODIUM LACTATE, POTASSIUM CHLORIDE, CALCIUM CHLORIDE 600; 310; 30; 20 MG/100ML; MG/100ML; MG/100ML; MG/100ML
INJECTION, SOLUTION INTRAVENOUS CONTINUOUS
Status: DISCONTINUED | OUTPATIENT
Start: 2021-07-14 | End: 2021-07-15 | Stop reason: HOSPADM

## 2021-07-14 RX ORDER — ONDANSETRON 4 MG/1
4 TABLET, ORALLY DISINTEGRATING ORAL EVERY 30 MIN PRN
Status: DISCONTINUED | OUTPATIENT
Start: 2021-07-14 | End: 2021-07-15 | Stop reason: HOSPADM

## 2021-07-14 RX ORDER — EPHEDRINE SULFATE 50 MG/ML
INJECTION, SOLUTION INTRAMUSCULAR; INTRAVENOUS; SUBCUTANEOUS PRN
Status: DISCONTINUED | OUTPATIENT
Start: 2021-07-14 | End: 2021-07-14

## 2021-07-14 RX ORDER — FENTANYL CITRATE 50 UG/ML
INJECTION, SOLUTION INTRAMUSCULAR; INTRAVENOUS PRN
Status: DISCONTINUED | OUTPATIENT
Start: 2021-07-14 | End: 2021-07-14

## 2021-07-14 RX ORDER — PROPOFOL 10 MG/ML
INJECTION, EMULSION INTRAVENOUS CONTINUOUS PRN
Status: DISCONTINUED | OUTPATIENT
Start: 2021-07-14 | End: 2021-07-14

## 2021-07-14 RX ORDER — KETAMINE HYDROCHLORIDE 10 MG/ML
INJECTION, SOLUTION INTRAMUSCULAR; INTRAVENOUS PRN
Status: DISCONTINUED | OUTPATIENT
Start: 2021-07-14 | End: 2021-07-14

## 2021-07-14 RX ORDER — ONDANSETRON 2 MG/ML
4 INJECTION INTRAMUSCULAR; INTRAVENOUS EVERY 30 MIN PRN
Status: DISCONTINUED | OUTPATIENT
Start: 2021-07-14 | End: 2021-07-15 | Stop reason: HOSPADM

## 2021-07-14 RX ORDER — FENTANYL CITRATE 50 UG/ML
25 INJECTION, SOLUTION INTRAMUSCULAR; INTRAVENOUS EVERY 5 MIN PRN
Status: CANCELLED | OUTPATIENT
Start: 2021-07-14 | End: 2021-07-14

## 2021-07-14 RX ORDER — ONDANSETRON 2 MG/ML
INJECTION INTRAMUSCULAR; INTRAVENOUS PRN
Status: DISCONTINUED | OUTPATIENT
Start: 2021-07-14 | End: 2021-07-14

## 2021-07-14 RX ORDER — LIDOCAINE HYDROCHLORIDE 10 MG/ML
INJECTION, SOLUTION INFILTRATION; PERINEURAL PRN
Status: DISCONTINUED | OUTPATIENT
Start: 2021-07-14 | End: 2021-07-14

## 2021-07-14 RX ORDER — PROPOFOL 10 MG/ML
INJECTION, EMULSION INTRAVENOUS PRN
Status: DISCONTINUED | OUTPATIENT
Start: 2021-07-14 | End: 2021-07-14

## 2021-07-14 RX ORDER — MEPERIDINE HYDROCHLORIDE 25 MG/ML
12.5 INJECTION INTRAMUSCULAR; INTRAVENOUS; SUBCUTANEOUS
Status: DISCONTINUED | OUTPATIENT
Start: 2021-07-14 | End: 2021-07-15 | Stop reason: HOSPADM

## 2021-07-14 RX ORDER — LIDOCAINE 40 MG/G
CREAM TOPICAL
Status: DISCONTINUED | OUTPATIENT
Start: 2021-07-14 | End: 2021-07-15 | Stop reason: HOSPADM

## 2021-07-14 RX ORDER — DEXAMETHASONE SODIUM PHOSPHATE 4 MG/ML
INJECTION, SOLUTION INTRA-ARTICULAR; INTRALESIONAL; INTRAMUSCULAR; INTRAVENOUS; SOFT TISSUE PRN
Status: DISCONTINUED | OUTPATIENT
Start: 2021-07-14 | End: 2021-07-14

## 2021-07-14 RX ORDER — ACETAMINOPHEN 325 MG/1
975 TABLET ORAL ONCE
Status: CANCELLED | OUTPATIENT
Start: 2021-07-14 | End: 2021-07-14

## 2021-07-14 RX ORDER — GLYCOPYRROLATE 0.2 MG/ML
INJECTION, SOLUTION INTRAMUSCULAR; INTRAVENOUS PRN
Status: DISCONTINUED | OUTPATIENT
Start: 2021-07-14 | End: 2021-07-14

## 2021-07-14 RX ORDER — FENTANYL CITRATE 50 UG/ML
50 INJECTION, SOLUTION INTRAMUSCULAR; INTRAVENOUS EVERY 5 MIN PRN
Status: SHIPPED | OUTPATIENT
Start: 2021-07-14 | End: 2021-07-14

## 2021-07-14 RX ADMIN — GLYCOPYRROLATE 0.2 MG: 0.2 INJECTION, SOLUTION INTRAMUSCULAR; INTRAVENOUS at 12:00

## 2021-07-14 RX ADMIN — DEXAMETHASONE SODIUM PHOSPHATE 10 MG: 4 INJECTION, SOLUTION INTRA-ARTICULAR; INTRALESIONAL; INTRAMUSCULAR; INTRAVENOUS; SOFT TISSUE at 12:00

## 2021-07-14 RX ADMIN — EPHEDRINE SULFATE 5 MG: 50 INJECTION, SOLUTION INTRAMUSCULAR; INTRAVENOUS; SUBCUTANEOUS at 12:08

## 2021-07-14 RX ADMIN — ONDANSETRON 4 MG: 2 INJECTION INTRAMUSCULAR; INTRAVENOUS at 12:00

## 2021-07-14 RX ADMIN — PROPOFOL 200 MG: 10 INJECTION, EMULSION INTRAVENOUS at 12:00

## 2021-07-14 RX ADMIN — SODIUM CHLORIDE, SODIUM LACTATE, POTASSIUM CHLORIDE, CALCIUM CHLORIDE: 600; 310; 30; 20 INJECTION, SOLUTION INTRAVENOUS at 11:28

## 2021-07-14 RX ADMIN — KETAMINE HYDROCHLORIDE 20 MG: 10 INJECTION, SOLUTION INTRAMUSCULAR; INTRAVENOUS at 12:00

## 2021-07-14 RX ADMIN — FENTANYL CITRATE 100 MCG: 50 INJECTION, SOLUTION INTRAMUSCULAR; INTRAVENOUS at 12:00

## 2021-07-14 RX ADMIN — PROPOFOL 180 MCG/KG/MIN: 10 INJECTION, EMULSION INTRAVENOUS at 12:00

## 2021-07-14 RX ADMIN — LIDOCAINE HYDROCHLORIDE 50 MG: 10 INJECTION, SOLUTION INFILTRATION; PERINEURAL at 12:00

## 2021-07-14 ASSESSMENT — MIFFLIN-ST. JEOR: SCORE: 1434.62

## 2021-07-14 NOTE — DISCHARGE INSTRUCTIONS
"Going Home With a Ureteral Stent    What is it?  A stent is a soft, plastic tube that helps urine (pee) drain into the bladder.  During the surgery, it is placed in the ureter the tube that connects the kidney to the bladder.  A thin curl at each end of the stent keeps one end in your kidney and the other in your bladder.  The stent can not be seen from outside of the body.    Why Do I Have It?  Some sort of blockage is not letting pee drain into your bladder.  This could be from a stone, certain surgeries or kidney infection.    What Should I Expect?   Stents often cause some discomfort.  You may have:    The need to pee suddenly    Pain when you pee    A dull backache, which may get worse when you pee  Blood in your pee (color of fruit punch) and some clots, which may increase with physical activity.     What Can I Do To Feel Better?    Drink a little more fluids than usual.  You can eat your normal diet.    Enjoy a warm bath.  Decrease your activity.  Some people find bending or twisting movement cause discomfort or increased blood in the urine.  Even so, you will not harm yourself.                                                        Kidney Stone [place]                                                        Stent Removal With String    -Take Tylenol or Ibuprofen and drink fluids 1 hour prior to removing your stent at home.    -Remove the stent in the morning on the specific day as directed by your doctor.  Gently pull on the string in the shower while urinating until the stent is completely removed.    -Call [facility] if you have questions or concerns [phone]    -What To Expect After Your Stent Is Removed    -After stent removal, urine may be bloody and possibly have some bloody clots.    -You may experience an \"achy\" pain due to urethral spasms.  This generally only lasts a few hours, but should resolve over the next 2-3 days    If you have any questions or concerns regarding your procedure, please " contact Dr. Kenyon, their office number is [phone].

## 2021-07-14 NOTE — ANESTHESIA PROCEDURE NOTES
Airway       Patient location during procedure: OR  Staff -        CRNA: Geeta Canales APRN CRNA       Performed By: CRNA  Consent for Airway        Urgency: elective  Indications and Patient Condition       Indications for airway management: carito-procedural       Induction type:intravenous       Mask difficulty assessment: 0 - not attempted    Final Airway Details       Final airway type: supraglottic airway    Supraglottic Airway Details        Type: LMA       Brand: AuraStraight       LMA size: 4    Post intubation assessment        Placement verified by: capnometry, equal breath sounds and chest rise        Number of attempts at approach: 1       Number of other approaches attempted: 0       Secured with: silk tape       Ease of procedure: easy       Dentition: Intact and Unchanged

## 2021-07-14 NOTE — ANESTHESIA PREPROCEDURE EVALUATION
Anesthesia Pre-Procedure Evaluation    Patient: Keena Vazquez   MRN: [id number] : 1970        Preoperative Diagnosis: Calculus of kidney [N20.0]  Renal colic [N23]  Hydronephrosis with urinary obstruction due to renal calculus [N13.2]   Procedure : Procedure(s):  LEFT CYSTOURETEROSCOPY, WITH RETROGRADE PYELOGRAM, HOLMIUM LASER LITHOTRIPSY OF URETERAL CALCULUS, AND STENT INSERTION     Past Medical History:   Diagnosis Date     Acute bilateral low back pain without sciatica      Anemia      Arthritis      BMI 39.0-39.9,adult      Fibromyalgia      Gallbladder polyp      GERD (gastroesophageal reflux disease)      Heart murmur      Heterotaxy syndrome     midline positioning of the liver, right-sided stomach and polysplenia     Hiatal hernia      Hypothyroidism      Mitral valve regurgitation      Obese      PONV (postoperative nausea and vomiting)      Renal disease      Situs inversus abdominalis      Systolic murmur     Had echocardiogram      Vitamin D deficiency       Past Surgical History:   Procedure Laterality Date      SECTION      Triplet delivery      CHOLECYSTECTOMY       DILATION AND CURETTAGE, DIAGNOSTIC / THERAPEUTIC      uterine polyp     OTHER SURGICAL HISTORY      PKR laser eye surgery     OTHER SURGICAL HISTORY  2015    uterine ablation     CT LAP, JIM RESTRICT PROC, LONGITUDINAL GASTRECTOMY N/A 2021    Procedure: GASTRECTOMY, SLEEVE, LAPAROSCOPIC;  Surgeon: Eric Lugo MD;  Location: [facility];  Service: General      No Known Allergies   Social History     Tobacco Use     Smoking status: Never Smoker     Smokeless tobacco: Never Used   Substance Use Topics     Alcohol use: Not Currently      Wt Readings from Last 1 Encounters:   21 83.5 kg (184 lb)        Anesthesia Evaluation   Pt has had prior anesthetic.     History of anesthetic complications  - PONV.      ROS/MED HX  ENT/Pulmonary:  - neg pulmonary ROS     Neurologic:  - neg neurologic ROS      Cardiovascular:  - neg cardiovascular ROS   (+) -----valvular problems/murmurs type: MR stable since childhood.     METS/Exercise Tolerance:     Hematologic:  - neg hematologic  ROS     Musculoskeletal:  - neg musculoskeletal ROS     GI/Hepatic:  - neg GI/hepatic ROS   (+) GERD, Asymptomatic on medication,     Renal/Genitourinary:  - neg Renal ROS   (+) renal disease (nephrolithiasis),     Endo:  - neg endo ROS   (+) thyroid problem, hypothyroidism, Obesity (bmi 31),     Psychiatric/Substance Use:  - neg psychiatric ROS     Infectious Disease:  - neg infectious disease ROS     Malignancy:  - neg malignancy ROS     Other:  - neg other ROS          Physical Exam    Airway  airway exam normal           Respiratory Devices and Support         Dental  no notable dental history     (+) caps      Cardiovascular          Rhythm and rate: regular and normal   (+) murmur       Pulmonary   pulmonary exam normal                OUTSIDE LABS:  CBC:   Lab Results   Component Value Date    WBC 12.7 (H) 03/31/2021    WBC 9.3 10/28/2020    HGB 12.4 03/31/2021    HGB 12.0 10/28/2020    HCT 38.7 03/31/2021    HCT 36.5 10/28/2020     03/31/2021     10/28/2020     BMP:   Lab Results   Component Value Date     03/31/2021     10/28/2020    POTASSIUM 3.7 03/31/2021    POTASSIUM 4.4 10/28/2020    CHLORIDE 103 03/31/2021    CHLORIDE 106 10/28/2020    CO2 25 03/31/2021    CO2 23 10/28/2020    BUN 16 03/31/2021    BUN 10 10/28/2020    CR 0.69 03/31/2021    CR 0.63 10/28/2020    GLC 81 03/31/2021    GLC 92 10/28/2020     COAGS:   Lab Results   Component Value Date    PTT 33 03/31/2021    INR 1.10 03/31/2021     POC:   Lab Results   Component Value Date    HCG Negative 04/06/2021     HEPATIC:   Lab Results   Component Value Date    ALBUMIN 4.0 03/31/2021    PROTTOTAL 8.0 03/31/2021    ALT 13 03/31/2021    AST 15 03/31/2021    ALKPHOS 64 03/31/2021    BILITOTAL 0.4 03/31/2021     OTHER:   Lab Results   Component Value  Date    A1C 5.5 10/28/2020    AZIZA 9.0 03/31/2021    TSH 3.58 11/27/2020    CRP 1.6 (H) 05/09/2018    SED 48 (H) 05/09/2018       Anesthesia Plan    ASA Status:  2   NPO Status:  NPO Appropriate    Anesthesia Type: General.     - Airway: LMA   Induction: Propofol, Intravenous.   Maintenance: TIVA.        Consents    Anesthesia Plan(s) and associated risks, benefits, and realistic alternatives discussed. Questions answered and patient/representative(s) expressed understanding.     - Discussed with:  Patient      - Extended Intubation/Ventilatory Support Discussed: No.      - Patient is DNR/DNI Status: No    Use of blood products discussed: No .     Postoperative Care    Pain management: Multi-modal analgesia.   PONV prophylaxis: Ondansetron (or other 5HT-3), Dexamethasone or Solumedrol, Background Propofol Infusion     Comments:                Leandro Parmar MD

## 2021-07-14 NOTE — ANESTHESIA CARE TRANSFER NOTE
Patient: Keena Vazquez    Procedure(s):  LEFT CYSTOURETEROSCOPY, WITH RETROGRADE PYELOGRAM, STENT PLACEMENT    Diagnosis: Calculus of kidney [N20.0]  Renal colic [N23]  Hydronephrosis with urinary obstruction due to renal calculus [N13.2]  Diagnosis Additional Information: No value filed.    Anesthesia Type:   General     Note:    Oropharynx: oropharynx clear of all foreign objects  Level of Consciousness: drowsy  Oxygen Supplementation: face mask  Level of Supplemental Oxygen (L/min / FiO2): 8  Independent Airway: airway patency satisfactory and stable  Dentition: dentition unchanged  Vital Signs Stable: post-procedure vital signs reviewed and stable  Report to RN Given: handoff report given  Patient transferred to: PACU    Handoff Report: Identifed the Patient, Identified the Reponsible Provider, Reviewed the pertinent medical history, Discussed the surgical course, Reviewed Intra-OP anesthesia mangement and issues during anesthesia, Set expectations for post-procedure period and Allowed opportunity for questions and acknowledgement of understanding      Vitals: (Last set prior to Anesthesia Care Transfer)  CRNA VITALS  7/14/2021 1157 - 7/14/2021 1230      7/14/2021             EKG:  NSR        Electronically Signed By: CHRISTOPHER Khan CRNA  July 14, 2021  12:30 PM

## 2021-07-14 NOTE — ANESTHESIA POSTPROCEDURE EVALUATION
Patient: Keena Vazquez    Procedure(s):  LEFT CYSTOURETEROSCOPY, WITH RETROGRADE PYELOGRAM, STENT PLACEMENT    Diagnosis:Calculus of kidney [N20.0]  Renal colic [N23]  Hydronephrosis with urinary obstruction due to renal calculus [N13.2]  Diagnosis Additional Information: No value filed.    Anesthesia Type:  General    Note:  Disposition: Outpatient   Postop Pain Control: Uneventful            Sign Out: Well controlled pain   PONV: No   Neuro/Psych: Uneventful            Sign Out: Acceptable/Baseline neuro status   Airway/Respiratory: Uneventful            Sign Out: Acceptable/Baseline resp. status   CV/Hemodynamics: Uneventful            Sign Out: Acceptable CV status; No obvious hypovolemia; No obvious fluid overload   Other NRE: NONE   DID A NON-ROUTINE EVENT OCCUR? No           Last vitals:  Vitals:    07/14/21 1230 07/14/21 1245 07/14/21 1300   BP: 112/60 122/63 123/68   Pulse: 73 70 74   Resp: 16 16    Temp: 96.9  F (36.1  C) 97.9  F (36.6  C)    SpO2: 100% 98% 99%       Last vitals prior to Anesthesia Care Transfer:  CRNA VITALS  7/14/2021 1157 - 7/14/2021 1257      7/14/2021             EKG:  NSR          Electronically Signed By: Leandro Parmar MD  July 14, 2021  1:27 PM

## 2021-07-15 ENCOUNTER — MYC MEDICAL ADVICE (OUTPATIENT)
Dept: UROLOGY | Facility: CLINIC | Age: 51
End: 2021-07-15

## 2021-07-16 NOTE — TELEPHONE ENCOUNTER
[facility] clinical support responded to patient's Evihart message.  Patient would like to remove her ureteral stent on a string.  Per Dr. Kenyon, there was existing inflammation in the ureter prior to surgery on 7/14/2021.  He would like patient to keep the stent in over the weekend and the stent may be removed on Monday.  Patient understands and agree with recommendations.  [facility] clinical support/nurse will call patient on Monday for post stent removal follow up.

## 2021-07-19 ENCOUNTER — ALLIED HEALTH/NURSE VISIT (OUTPATIENT)
Dept: UROLOGY | Facility: CLINIC | Age: 51
End: 2021-07-19
Payer: COMMERCIAL

## 2021-07-19 DIAGNOSIS — N20.0 CALCULUS OF KIDNEY: Primary | ICD-10-CM

## 2021-07-19 PROCEDURE — 99207 PR NO CHARGE NURSE ONLY: CPT

## 2021-07-19 NOTE — PROGRESS NOTES
Message left for patient to call back to [facility] to see how she is doing after removing her stent on a string.  Justyna Sosa RN

## 2021-07-28 ENCOUNTER — LAB REQUISITION (OUTPATIENT)
Dept: LAB | Facility: CLINIC | Age: 51
End: 2021-07-28

## 2021-07-28 DIAGNOSIS — Z57.9 OCCUPATIONAL EXPOSURE TO UNSPECIFIED RISK FACTOR: ICD-10-CM

## 2021-07-28 DIAGNOSIS — E03.9 HYPOTHYROIDISM, UNSPECIFIED: ICD-10-CM

## 2021-07-28 LAB
HIV 1+2 AB+HIV1 P24 AG SERPL QL IA: NEGATIVE
T4 FREE SERPL-MCNC: 1.55 NG/DL (ref 0.7–1.8)
TSH SERPL DL<=0.005 MIU/L-ACNC: 0.01 UIU/ML (ref 0.3–5)

## 2021-07-28 PROCEDURE — 86706 HEP B SURFACE ANTIBODY: CPT | Performed by: PHYSICIAN ASSISTANT

## 2021-07-28 PROCEDURE — 86803 HEPATITIS C AB TEST: CPT | Performed by: PHYSICIAN ASSISTANT

## 2021-07-28 PROCEDURE — 84443 ASSAY THYROID STIM HORMONE: CPT | Performed by: PHYSICIAN ASSISTANT

## 2021-07-28 PROCEDURE — 36415 COLL VENOUS BLD VENIPUNCTURE: CPT | Performed by: PHYSICIAN ASSISTANT

## 2021-07-28 PROCEDURE — 84439 ASSAY OF FREE THYROXINE: CPT | Performed by: PHYSICIAN ASSISTANT

## 2021-07-28 PROCEDURE — 87389 HIV-1 AG W/HIV-1&-2 AB AG IA: CPT | Performed by: PHYSICIAN ASSISTANT

## 2021-07-28 SDOH — HEALTH STABILITY - PHYSICAL HEALTH: OCCUPATIONAL EXPOSURE TO UNSPECIFIED RISK FACTOR: Z57.9

## 2021-07-29 LAB
HBV SURFACE AB SERPLBLD QL IA.RAPID: NEGATIVE
HCV AB SERPL QL IA: NEGATIVE

## 2021-07-29 NOTE — OP NOTE
[facility]  [facility] [place] Operative Note    Keena Vazquez   July 29, 2021 7/14/2021   Billie Hensley     Procedure Performed  Procedure(s):  LEFT CYSTOURETEROSCOPY, WITH RETROGRADE PYELOGRAM, STENT PLACEMENT  1. Cystoscopy  2. Retrograde Pyelography - Left  3. Diagnostic Ureteroscopy - Left  4. Ureteral Stent Insertion - Left    Pre-operative Diagnosis  Calculus of kidney [N20.0]  Renal colic [N23]  Hydronephrosis with urinary obstruction due to renal calculus [N13.2]    Post-operative Diagnosis  1. passed ureteral stone      Surgeon(s) and Role:     * Krishna Kenyon MD - Primary    Anesthesia Type  Not documented     Procedural Summary    Estimation of stone clearance: passage of stone prior to surgery  Subjective stone composition: stone not identified  Renal papillae assessment: plaques mild  Unanticipated event/findings: absent stone  Post-operative plan: remove own stent in 1 week with extraction string    Narrative    Presented with 6 mm left proximal stone. Pain free in OR today but has had pain within last 24 hours. Stone was not evident despite excellent visibility. Assume very recent passage.    Procedural Details    Patient is brought to the surgical suite where Not documented anesthesia is induced. she is prepped and draped in standard fashion in combined Trendelenberg and lithotomy position.    Cystoscopy: Rigid cystoscopy is performed. Introitus is normal. Bladder has edema at left ureteric orifice.     Retrograde Pyelography:  imaging fails to demonstrate radio-opaque proximal ureteric stone consistent with pre-operative imaging. Administration of contrast media demonstrates dilation of the left ureter to the level of the bladder without obvious filling defect.     Ureteral Access: Left ureteral access is initiated with Bentson wire. 8F dilator is inserted with minimal resistance. 10F dilator is inserted with minimal resistance.      Rigid Ureteroscopy: Rigid  ureteroscope is inserted in left ureter. No evidence of stone to the level of the iliacs.      Flexible Ureterorenoscopy: Flexible ureteroscope is passed to kidney. No evidence of stone within the upper tract collecting system despite excellent visibility.     Ureteral Stent Insertion: Left 7F 24 cm stent is inserted with good coil in kidney and bladder under fluoroscopic guidance. Stent extraction string left dangling from urethra.      The patient was then taken to the recovery room in good condition.     Past Medical History:   Diagnosis Date     Acute bilateral low back pain without sciatica      Anemia      Arthritis      BMI 39.0-39.9,adult      Fibromyalgia      Gallbladder polyp      GERD (gastroesophageal reflux disease)      Heart murmur      Heterotaxy syndrome     midline positioning of the liver, right-sided stomach and polysplenia     Hiatal hernia      Hypothyroidism      Mitral valve regurgitation      Obese      PONV (postoperative nausea and vomiting)      Renal disease      Situs inversus abdominalis      Systolic murmur     Had echocardiogram 2002     Vitamin D deficiency         Patient Active Problem List   Diagnosis     Heterotaxy syndrome     Morbid obesity (H)     Mitral valve regurgitation     Other specified hypothyroidism     Morbid obesity with BMI of 40.0-44.9, adult (H)     Morbid obesity due to excess calories (H)     Calculus of kidney     Renal colic     Hydronephrosis with urinary obstruction due to renal calculus        Estimated Blood Loss  .0 mL     * No specimens in log *

## 2021-09-15 ENCOUNTER — MYC MEDICAL ADVICE (OUTPATIENT)
Dept: SURGERY | Facility: CLINIC | Age: 51
End: 2021-09-15

## 2021-09-15 DIAGNOSIS — Z98.84 BARIATRIC SURGERY STATUS: Primary | ICD-10-CM

## 2021-09-15 DIAGNOSIS — E78.5 DYSLIPIDEMIA: ICD-10-CM

## 2021-09-15 DIAGNOSIS — Z90.3 POSTGASTRECTOMY MALABSORPTION: ICD-10-CM

## 2021-09-15 DIAGNOSIS — K91.2 POSTGASTRECTOMY MALABSORPTION: ICD-10-CM

## 2021-09-22 ENCOUNTER — LAB REQUISITION (OUTPATIENT)
Dept: LAB | Facility: CLINIC | Age: 51
End: 2021-09-22

## 2021-09-22 ENCOUNTER — LAB (OUTPATIENT)
Dept: LAB | Facility: HOSPITAL | Age: 51
End: 2021-09-22
Payer: COMMERCIAL

## 2021-09-22 DIAGNOSIS — K91.2 POSTGASTRECTOMY MALABSORPTION: ICD-10-CM

## 2021-09-22 DIAGNOSIS — E78.5 DYSLIPIDEMIA: ICD-10-CM

## 2021-09-22 DIAGNOSIS — Z90.3 POSTGASTRECTOMY MALABSORPTION: ICD-10-CM

## 2021-09-22 DIAGNOSIS — E03.9 HYPOTHYROIDISM, UNSPECIFIED: ICD-10-CM

## 2021-09-22 DIAGNOSIS — Z98.84 BARIATRIC SURGERY STATUS: ICD-10-CM

## 2021-09-22 LAB
ALBUMIN SERPL-MCNC: 3.6 G/DL (ref 3.5–5)
ALP SERPL-CCNC: 69 U/L (ref 45–120)
ALT SERPL W P-5'-P-CCNC: 11 U/L (ref 0–45)
ANION GAP SERPL CALCULATED.3IONS-SCNC: 11 MMOL/L (ref 5–18)
AST SERPL W P-5'-P-CCNC: 13 U/L (ref 0–40)
BILIRUB SERPL-MCNC: 0.6 MG/DL (ref 0–1)
BUN SERPL-MCNC: 8 MG/DL (ref 8–22)
CALCIUM SERPL-MCNC: 9.4 MG/DL (ref 8.5–10.5)
CHLORIDE BLD-SCNC: 106 MMOL/L (ref 98–107)
CHOLEST SERPL-MCNC: 177 MG/DL
CO2 SERPL-SCNC: 20 MMOL/L (ref 22–31)
CREAT SERPL-MCNC: 0.63 MG/DL (ref 0.6–1.1)
ERYTHROCYTE [DISTWIDTH] IN BLOOD BY AUTOMATED COUNT: 14.9 % (ref 10–15)
FASTING STATUS PATIENT QL REPORTED: YES
FERRITIN SERPL-MCNC: 18 NG/ML (ref 10–130)
FOLATE SERPL-MCNC: 18.5 NG/ML
GFR SERPL CREATININE-BSD FRML MDRD: >90 ML/MIN/1.73M2
GLUCOSE BLD-MCNC: 73 MG/DL (ref 70–125)
HBA1C MFR BLD: 4.7 %
HCT VFR BLD AUTO: 35.7 % (ref 35–47)
HDLC SERPL-MCNC: 49 MG/DL
HGB BLD-MCNC: 11.6 G/DL (ref 11.7–15.7)
LDLC SERPL CALC-MCNC: 117 MG/DL
MCH RBC QN AUTO: 27.4 PG (ref 26.5–33)
MCHC RBC AUTO-ENTMCNC: 32.5 G/DL (ref 31.5–36.5)
MCV RBC AUTO: 84 FL (ref 78–100)
PLATELET # BLD AUTO: 277 10E3/UL (ref 150–450)
POTASSIUM BLD-SCNC: 3.4 MMOL/L (ref 3.5–5)
PROT SERPL-MCNC: 7.3 G/DL (ref 6–8)
PTH-INTACT SERPL-MCNC: 29 PG/ML (ref 10–86)
RBC # BLD AUTO: 4.24 10E6/UL (ref 3.8–5.2)
SODIUM SERPL-SCNC: 137 MMOL/L (ref 136–145)
T4 FREE SERPL-MCNC: 1.26 NG/DL (ref 0.7–1.8)
TRIGL SERPL-MCNC: 56 MG/DL
TSH SERPL DL<=0.005 MIU/L-ACNC: 0.03 UIU/ML (ref 0.3–5)
VIT B12 SERPL-MCNC: 838 PG/ML (ref 213–816)
WBC # BLD AUTO: 9.3 10E3/UL (ref 4–11)

## 2021-09-22 PROCEDURE — 82728 ASSAY OF FERRITIN: CPT

## 2021-09-22 PROCEDURE — 84439 ASSAY OF FREE THYROXINE: CPT | Performed by: PHYSICIAN ASSISTANT

## 2021-09-22 PROCEDURE — 85027 COMPLETE CBC AUTOMATED: CPT

## 2021-09-22 PROCEDURE — 82306 VITAMIN D 25 HYDROXY: CPT

## 2021-09-22 PROCEDURE — 82746 ASSAY OF FOLIC ACID SERUM: CPT

## 2021-09-22 PROCEDURE — 82465 ASSAY BLD/SERUM CHOLESTEROL: CPT

## 2021-09-22 PROCEDURE — 82525 ASSAY OF COPPER: CPT

## 2021-09-22 PROCEDURE — 80053 COMPREHEN METABOLIC PANEL: CPT

## 2021-09-22 PROCEDURE — 84630 ASSAY OF ZINC: CPT

## 2021-09-22 PROCEDURE — 83036 HEMOGLOBIN GLYCOSYLATED A1C: CPT

## 2021-09-22 PROCEDURE — 36415 COLL VENOUS BLD VENIPUNCTURE: CPT

## 2021-09-22 PROCEDURE — 84443 ASSAY THYROID STIM HORMONE: CPT | Performed by: PHYSICIAN ASSISTANT

## 2021-09-22 PROCEDURE — 84590 ASSAY OF VITAMIN A: CPT

## 2021-09-22 PROCEDURE — 83970 ASSAY OF PARATHORMONE: CPT

## 2021-09-22 PROCEDURE — 82607 VITAMIN B-12: CPT

## 2021-09-23 DIAGNOSIS — Z98.84 BARIATRIC SURGERY STATUS: Primary | ICD-10-CM

## 2021-09-23 DIAGNOSIS — K91.2 POSTGASTRECTOMY MALABSORPTION: ICD-10-CM

## 2021-09-23 DIAGNOSIS — Z90.3 POSTGASTRECTOMY MALABSORPTION: ICD-10-CM

## 2021-09-23 NOTE — PROGRESS NOTES
Lab called and said that the B1 draw wasn't handled properly so it will need to be redrawn.  Lab order placed again.  Ally Ji RN

## 2021-09-25 ENCOUNTER — LAB (OUTPATIENT)
Dept: LAB | Facility: HOSPITAL | Age: 51
End: 2021-09-25
Payer: COMMERCIAL

## 2021-09-25 DIAGNOSIS — Z90.3 POSTGASTRECTOMY MALABSORPTION: ICD-10-CM

## 2021-09-25 DIAGNOSIS — Z98.84 BARIATRIC SURGERY STATUS: ICD-10-CM

## 2021-09-25 DIAGNOSIS — K91.2 POSTGASTRECTOMY MALABSORPTION: ICD-10-CM

## 2021-09-25 LAB
COPPER SERPL-MCNC: 162.5 UG/DL
ZINC SERPL-MCNC: 77 UG/DL

## 2021-09-25 PROCEDURE — 36415 COLL VENOUS BLD VENIPUNCTURE: CPT

## 2021-09-25 PROCEDURE — 84425 ASSAY OF VITAMIN B-1: CPT

## 2021-09-26 LAB
ANNOTATION COMMENT IMP: NORMAL
DEPRECATED CALCIDIOL+CALCIFEROL SERPL-MC: <70 UG/L (ref 20–75)
RETINYL PALMITATE SERPL-MCNC: <0.02 MG/L
VIT A SERPL-MCNC: 0.3 MG/L
VITAMIN D2 SERPL-MCNC: <5 UG/L
VITAMIN D3 SERPL-MCNC: 65 UG/L

## 2021-09-28 PROBLEM — D50.9 ANEMIA, IRON DEFICIENCY: Status: ACTIVE | Noted: 2021-09-28

## 2021-09-29 LAB — VIT B1 PYROPHOSHATE BLD-SCNC: 96 NMOL/L

## 2021-10-27 ENCOUNTER — LAB REQUISITION (OUTPATIENT)
Dept: LAB | Facility: CLINIC | Age: 51
End: 2021-10-27

## 2021-10-27 DIAGNOSIS — E03.9 HYPOTHYROIDISM, UNSPECIFIED: ICD-10-CM

## 2021-10-27 DIAGNOSIS — Z57.9 OCCUPATIONAL EXPOSURE TO UNSPECIFIED RISK FACTOR: ICD-10-CM

## 2021-10-27 LAB
HIV 1+2 AB+HIV1 P24 AG SERPL QL IA: NEGATIVE
T4 FREE SERPL-MCNC: 1.21 NG/DL (ref 0.7–1.8)
TSH SERPL DL<=0.005 MIU/L-ACNC: 0.13 UIU/ML (ref 0.3–5)

## 2021-10-27 PROCEDURE — 87389 HIV-1 AG W/HIV-1&-2 AB AG IA: CPT | Performed by: FAMILY MEDICINE

## 2021-10-27 PROCEDURE — 84439 ASSAY OF FREE THYROXINE: CPT | Performed by: PHYSICIAN ASSISTANT

## 2021-10-27 PROCEDURE — 84443 ASSAY THYROID STIM HORMONE: CPT | Performed by: PHYSICIAN ASSISTANT

## 2021-10-27 SDOH — HEALTH STABILITY - PHYSICAL HEALTH: OCCUPATIONAL EXPOSURE TO UNSPECIFIED RISK FACTOR: Z57.9

## 2021-11-24 ENCOUNTER — VIRTUAL VISIT (OUTPATIENT)
Dept: SURGERY | Facility: CLINIC | Age: 51
End: 2021-11-24
Payer: COMMERCIAL

## 2021-11-24 VITALS — WEIGHT: 162 LBS | BODY MASS INDEX: 27.81 KG/M2

## 2021-11-24 DIAGNOSIS — Z90.3 POSTGASTRECTOMY MALABSORPTION: Primary | ICD-10-CM

## 2021-11-24 DIAGNOSIS — K91.2 POSTGASTRECTOMY MALABSORPTION: Primary | ICD-10-CM

## 2021-11-24 PROCEDURE — 99214 OFFICE O/P EST MOD 30 MIN: CPT | Mod: GT | Performed by: FAMILY MEDICINE

## 2021-11-24 RX ORDER — FERROUS SULFATE 325(65) MG
325 TABLET ORAL
COMMUNITY

## 2021-11-24 NOTE — PATIENT INSTRUCTIONS
[facility]  Nutritional Guidelines  Gastric Sleeve 6 Months Post Op    General Guidelines and Helpful Hints:    Eat 3 meals per day + protein supplement(s). No snacks between meals.  o Do not skip meals.  This can cause overeating at the next meal and will prevent adequate protein and nutritional intake.    Aim for 60-80 grams of protein per day.   o Always eat your protein first. This assists with optimal nutrition and helps you stay full longer.  o To achieve daily protein goals, it is recommended to drink approved protein supplement between meals.    Follow appropriate portion size: Use measuring cups to be accurate.    Months Post Op Portion Size per Meal   6 months 1/2 cup   7-8 months 1/2 - 2/3 cup   8 -9 months 2/3 - 3/4 cup   10-12 months 3/4 - 1 cup   12 months and beyond 1 cup maximum       Continue to use saucer/salad plates, infant/toddler silverware to keep portion sizes small and take small bites.    Eat S-L-O-W-L-Y to make each meal last 20-30 minutes. Always stop eating when satisfied.    Continue to use caution with foods containing skins, peels or membranes. Chew well!    Aim for 64 oz. of calorie-free fluids daily.  o Continue to avoid caffeine, carbonation and alcohol.  o Remember to avoid drinking during meals, 15-30 minutes before and 30 minutes after.    Aim for 30-60 minutes of physical activity most days of the week.    If having trouble tolerating meat, try using a crock-pot, tinfoil tent, steamer or other moist cooking method to create tender meats. Add broth or low-fat gravy to help meat stay moist.     Avoid high sugar and high fat foods to prevent high calorie intake. This will reduce your rate of weight loss.  o Check nutrition labels for less than 10 grams of sugar and less than 10 grams of fat per serving.    Continue Taking Vitamins/Minerals:  o 5445-2491 mcg of Sublingual B-12 daily  o 1 Multivitamin with Iron twice daily (chewable or swallow tabs)  o 500-600 mg Calcium  Citrate twice daily (chewable or swallow tabs)  o 5000 IU Vitamin D3 daily  o One iron tablet per day for menstruating females  o You may need an additional B complex or thiamine tablet    Sample Grocery List    Protein:    Fat free Greek or light yogurt (less than 10 grams sugar)    Fat free or low-fat cottage cheese    String cheese or reduced fat cheese slices    Tuna, salmon, crab, egg, or chicken salad made with light or fat free mayonnaise    Egg or Egg Substitute    Lean/extra lean turkey, beef, bison, venison (ground, sirloin, round, flank)    Pork loin or tenderloin (grilled, baked, broiled)    Fish such as salmon, tuna, trout, tilapia, etc. (grilled, baked, broiled)    Tender cuts of lean (skinless) turkey or chicken    Lean deli meats: turkey, lean ham, chicken, lean roast beef    Beans such as kidney, garbanzo, black, kaur, or low-fat/fat free refried beans    Peanut butter (natural preferred). Limit to 1 Tbsp. per day.    Low-fat meatloaf (made with lean ground beef or turkey)    Sloppy Joes made with low-sugar ketchup and lean ground beef or turkey    Soy or vegetable protein (i.e. vegan crumbles, soy/veggie burger, tofu)    Hummus    Vegetables:    Fresh: cooked or raw (as tolerated)    Frozen vegetables    Canned vegetables (low sodium or no salt added, rinse before cooking/eating)    (Ok to have skins/peels/membranes/seeds - just chew well)    Fruits:    Fresh fruit    Frozen fruit (no sugar added)    Canned fruit (packed in its own juice, NOT syrup)    (Ok to have skins/peels/membranes/seeds - just chew well)    Starch:    Unsweetened whole-grain hot cereal (or high fiber cold cereal, dry)    Toasted whole wheat bread or Rose Thins    Whole grain crackers    Baked/boiled/mashed potato/sweet potato    Cooked whole grain pasta, brown rice, or other cooked whole grains    Starchy vegetables: corn, peas, winter squash    Protein Supplement:     Ready to drink protein shake with:  o 15-30 grams  protein per serving  o Less than 10 grams total carbohydrate per serving     Protein powder mixed with:  o  Skim or 1% milk  o Low fat or fat free Lactaid milk, plain or no sugar added soymilk  o Water     Fats: (use in moderation)    1 teaspoon of soft tub margarine    1 teaspoon olive oil, canola oil, or peanut oil    1 tablespoon of low-fat meneses or salad dressing     Sample Menu for 6 months after Gastric Sleeve    You do NOT need to eat/drink the full portion sizes listed below  Always stop when you are satisfied  Breakfast 6 Tbsp. 1% cottage cheese   2 Tbsp. peaches    Lunch 2 oz. lean hamburger or veggie burger  1-2 tsp. salsa   Supplement Approved Protein Shake   (Have between meals throughout the day)   Dinner 6 Tbsp. chicken breast  1-2 Tbsp. green beans     Breakfast 2 Eggs or   cup scrambled egg substitute product   Lunch 7 Tbsp. low-fat Sloppy Jeff mixture   2 high fiber crackers   Supplement Approved Protein Shake   (Have between meals throughout the day)   Dinner 6 Tbsp. grilled, broiled, or baked lemon pepper salmon  2 Tbsp. asparagus     Breakfast 6 Tbsp. light yogurt with 2 Tbsp. Grape Nuts or high fiber cereal    Lunch   cup of chili made with extra lean ground beef and kidney beans   Dinner 5 Tbsp. pork loin made in a crock pot  2 Tbsp. cooked broccoli  1 Tbsp. baked potato with 2 sprays of spray margarine    Supplement Approved Protein Shake   (Have between meals throughout the day)     Breakfast 6 Tbsp. lean ham  2 Tbsp. seedless melon   Lunch 7 Tbsp. diced turkey with 1 teaspoon low-fat gravy  1 Tbsp. green beans   Dinner 6 Tbsp. extra lean ground beef mixed with low sugar spaghetti sauce  2 Tbsp. whole wheat pasta   Supplement Approved Protein Shake   (Have between meals throughout the day)     Breakfast 2 oz turkey or soy based sausage mikal    Lunch 6 Tbsp. low-fat cottage cheese  2 Tbsp. pineapple   Supplement Approved Protein Shake   (Have between meals throughout the day)   Dinner 7 Tbsp.  beef tenderloin  1 Tbsp. asparagus     Breakfast 1/2 of a whole wheat English Muffin (toasted)  1 Tbsp. creamy natural peanut butter   Lunch   cup of black bean soup with 1 Tbsp. low fat shredded cheese   Dinner 7 Tbsp. low-fat turkey meat loaf   1 Tbsp. cooked carrots   Supplement Approved Protein Shake   (Have between meals throughout the day)     Breakfast 6 Tbsp. scrambled egg or scrambled egg substitute  1 Tbsp. finely chopped bell pepper  1 Tbsp. low fat shredded cheese   Lunch 7 Tbsp. lean diced ham  1 Tbsp. mandarin oranges   Supplement Approved Protein Shake   (Have between meals throughout the day)   Dinner 6 Tbsp. flaked fish   2 Tbsp. mashed sweet potato

## 2021-11-24 NOTE — PROGRESS NOTES
Bariatric Care Clinic Follow Up Visit for Previous Bariatric Surgery   Date of visit: 11/24/2021  Physician: ANTONIO Graham MD, MD  Primary Care is Billie Hensley  Keena Vazquez   51 year old  female    Date of Surgery: 4/6/21  Initial Weight: 236#  Initial BMI: 40.63  Today's Weight: Patient reported weight 162#  Wt Readings from Last 1 Encounters:   11/24/21 73.5 kg (162 lb)     Body mass index is 27.81 kg/m .       Assessment and Plan   Assessment: Keena is a 51 year old year old female who is 6 months s/p LSG with Dr. Lugo. They have had a durable weight loss of 74 lbs since surgery.  Overall compliance with the [facility] has been excellent.      Plan:    1. Postgastrectomy malabsorption  Patient is taking all vitamins as directed.  Recent routine postoperative labs were normal except insufficient ferritin. Since then she has started taking her iron pill every day. She was reminded to slow down, chew foods thoroughly, and to avoid liquids within 30 minutes of solids. Written information was given. She was congratulated on her success thus far.        Total time spent on the date of this encounter doing: chart review, review of test results, patient visit, physical exam, education, counseling, developing plan of care and documenting = 36 minutes.      Bariatric Surgery Review   Interim History/LifeChanges: Patent had a kidney stone. She is now feeling better. She is no longer having joint pain and back pain. Her energy has improved.     Patient Concerns: none    GERD none    Medication changes: synthroid dose changes    Vitamin Intake:   Multivitamin   2 gummies,  iron   Vitamin D  5000 iu   Calcium  2 x per day   Vit. B-12    SL     Habits:            Alcohol Intake  rare sip   NSAID Use  none   Caffeine Use  green tea lemonade occasionally   Exercise  treadmill with video , most days for 30 minutes   CPAP Use:  na   Birth Control  's vascetomy    Tobacco Use     none                                      LABS: normal except low hemoglobin and insufficient ferritin      LABS:  Lab Results   Component Value Date    WBC 9.3 09/22/2021    HGB 11.6 (L) 09/22/2021    HCT 35.7 09/22/2021    MCV 84 09/22/2021     09/22/2021      No components found for: [id number] No results found for: HGBA1C   Lab Results   Component Value Date    CHOL 177 09/22/2021    No components found for: PTH      No components found for: FERRITIN   Lab Results   Component Value Date    HDL 49 (L) 09/22/2021      No components found for: [id number] No components found for: 70479   No components found for: LDLCALC Lab Results   Component Value Date    TSH 0.13 (L) 10/27/2021    No components found for: FOLATE   Lab Results   Component Value Date    TRIG 56 09/22/2021    Lab Results   Component Value Date    ALT 11 09/22/2021    AST 13 09/22/2021    ALKPHOS 69 09/22/2021    No results found for: TESTOSTERONE     No components found for: CHOLHDL No components found for: 7597   @[facility]@             Patient Profile   Social History     Social History Narrative     Not on file        Past Medical History   Past Medical History:   Diagnosis Date     Acute bilateral low back pain without sciatica      Anemia      Arthritis      BMI 39.0-39.9,adult      Fibromyalgia      Gallbladder polyp      GERD (gastroesophageal reflux disease)      Heart murmur      Heterotaxy syndrome     midline positioning of the liver, right-sided stomach and polysplenia     Hiatal hernia      Hypothyroidism      Mitral valve regurgitation      Obese      PONV (postoperative nausea and vomiting)      Renal disease      Situs inversus abdominalis      Systolic murmur     Had echocardiogram 2002     Vitamin D deficiency      Patient Active Problem List   Diagnosis     Heterotaxy syndrome     Morbid obesity (H)     Mitral valve regurgitation     Other specified hypothyroidism     Morbid obesity with  BMI of 40.0-44.9, adult (H)     Morbid obesity due to excess calories (H)     Calculus of kidney     Renal colic     Hydronephrosis with urinary obstruction due to renal calculus     Anemia, iron deficiency     [unfilled]    Past Surgical History  She has a past surgical history that includes Cholecystectomy; Dilation And Curettage, Diagnostic / Therapeutic;  section; other surgical history; other surgical history (); Pr Lap, Noe Restrict Proc, Longitudinal Gastrectomy (N/A, 2021); and Cystoscopy, Ureteroscopy, Insert Stent (Left, 2021).     Examination   Wt 73.5 kg (162 lb)   BMI 27.81 kg/m      GENERAL: Healthy, alert and no distress  EYES: Eyes grossly normal to inspection.  No discharge or erythema, or obvious scleral/conjunctival abnormalities.  RESP: No audible wheeze, cough, or visible cyanosis.  No visible retractions or increased work of breathing.    SKIN: Visible skin clear. No significant rash, abnormal pigmentation or lesions.  NEURO: Cranial nerves grossly intact.  Mentation and speech appropriate for age.  PSYCH: Mentation appears normal, affect normal/bright, judgement and insight intact, normal speech and appearance well-groomed.         Counseling:   We reviewed the important post op bariatric recommendations:  -eating 3 meals daily  -eating protein first, getting >60gm protein daily  -eating slowly, chewing food well  -avoiding/limiting calorie containing beverages  -drinking water 15-30 minutes before or after meals  -choosing wheat, not white with breads, crackers, pastas, radha, bagels, tortillas, rice  -limiting restaurant or cafeteria eating to twice a week or less    We discussed the importance of restorative sleep and stress management in maintaining a healthy weight.  We discussed the National Weight Control Registry healthy weight maintenance strategies and ways to optimize metabolism.  We discussed the importance of physical activity including cardiovascular  and strength training in maintaining a healthier weight.  We discussed the importance of life-long vitamin supplementation and life-long  follow-up.    Keena was reminded that, to avoid marginal ulcers she should avoid tobacco at all, alcohol in excess, caffeine in excess, and NSAIDS (unless indicated for cardioprotection or othewise and opposed by a PPI).    ANTONIO Graham MD  [facility]  11/24/2021  6:18 AM            No images are attached to the encounter.

## 2021-11-24 NOTE — LETTER
11/24/2021         RE: Keena Vazquez  [address] [address]        Dear Colleague,    Thank you for referring your patient, Keena Vazquez, to the [facility]. Please see a copy of my visit note below.    Bariatric Care Clinic Follow Up Visit for Previous Bariatric Surgery   Date of visit: 11/24/2021  Physician: ANTONIO Graham MD, MD  Primary Care is Billie Hensley  Keena Vazquez   51 year old  female    Date of Surgery: 4/6/21  Initial Weight: 236#  Initial BMI: 40.63  Today's Weight: Patient reported weight 162#  Wt Readings from Last 1 Encounters:   11/24/21 73.5 kg (162 lb)     Body mass index is 27.81 kg/m .       Assessment and Plan   Assessment: Keena is a 51 year old year old female who is 6 months s/p LSG with Dr. Lugo. They have had a durable weight loss of 74 lbs since surgery.  Overall compliance with the [facility] Bariatric Surgery Program has been excellent.      Plan:    1. Postgastrectomy malabsorption  Patient is taking all vitamins as directed.  Recent routine postoperative labs were normal except insufficient ferritin. Since then she has started taking her iron pill every day. She was reminded to slow down, chew foods thoroughly, and to avoid liquids within 30 minutes of solids. Written information was given. She was congratulated on her success thus far.        Total time spent on the date of this encounter doing: chart review, review of test results, patient visit, physical exam, education, counseling, developing plan of care and documenting = 36 minutes.      Bariatric Surgery Review   Interim History/LifeChanges: Patent had a kidney stone. She is now feeling better. She is no longer having joint pain and back pain. Her energy has improved.     Patient Concerns: none    GERD none    Medication changes: synthroid dose changes    Vitamin Intake:   Multivitamin   2 gummies,  iron   Vitamin D   5000 iu   Calcium  2 x per day   Vit. B-12    SL     Habits:            Alcohol Intake  rare sip   NSAID Use  none   Caffeine Use  green tea lemonade occasionally   Exercise  treadmill with video , most days for 30 minutes   CPAP Use:  na   Birth Control  's vascetomy   Tobacco Use     none                                      LABS: normal except low hemoglobin and insufficient ferritin      LABS:  Lab Results   Component Value Date    WBC 9.3 09/22/2021    HGB 11.6 (L) 09/22/2021    HCT 35.7 09/22/2021    MCV 84 09/22/2021     09/22/2021      No components found for: [id number] No results found for: HGBA1C   Lab Results   Component Value Date    CHOL 177 09/22/2021    No components found for: PTH      No components found for: FERRITIN   Lab Results   Component Value Date    HDL 49 (L) 09/22/2021      No components found for: [id number] No components found for: 99352   No components found for: LDLCALC Lab Results   Component Value Date    TSH 0.13 (L) 10/27/2021    No components found for: FOLATE   Lab Results   Component Value Date    TRIG 56 09/22/2021    Lab Results   Component Value Date    ALT 11 09/22/2021    AST 13 09/22/2021    ALKPHOS 69 09/22/2021    No results found for: TESTOSTERONE     No components found for: CHOLHDL No components found for: 7597   @[facility](vitamin a: 1)@             Patient Profile   Social History     Social History Narrative     Not on file        Past Medical History   Past Medical History:   Diagnosis Date     Acute bilateral low back pain without sciatica      Anemia      Arthritis      BMI 39.0-39.9,adult      Fibromyalgia      Gallbladder polyp      GERD (gastroesophageal reflux disease)      Heart murmur      Heterotaxy syndrome     midline positioning of the liver, right-sided stomach and polysplenia     Hiatal hernia      Hypothyroidism      Mitral valve regurgitation      Obese      PONV (postoperative nausea and vomiting)      Renal disease       Situs inversus abdominalis      Systolic murmur     Had echocardiogram      Vitamin D deficiency      Patient Active Problem List   Diagnosis     Heterotaxy syndrome     Morbid obesity (H)     Mitral valve regurgitation     Other specified hypothyroidism     Morbid obesity with BMI of 40.0-44.9, adult (H)     Morbid obesity due to excess calories (H)     Calculus of kidney     Renal colic     Hydronephrosis with urinary obstruction due to renal calculus     Anemia, iron deficiency     [unfilled]    Past Surgical History  She has a past surgical history that includes Cholecystectomy; Dilation And Curettage, Diagnostic / Therapeutic;  section; other surgical history; other surgical history (); Pr Lap, Noe Restrict Proc, Longitudinal Gastrectomy (N/A, 2021); and Cystoscopy, Ureteroscopy, Insert Stent (Left, 2021).     Examination   Wt 73.5 kg (162 lb)   BMI 27.81 kg/m      GENERAL: Healthy, alert and no distress  EYES: Eyes grossly normal to inspection.  No discharge or erythema, or obvious scleral/conjunctival abnormalities.  RESP: No audible wheeze, cough, or visible cyanosis.  No visible retractions or increased work of breathing.    SKIN: Visible skin clear. No significant rash, abnormal pigmentation or lesions.  NEURO: Cranial nerves grossly intact.  Mentation and speech appropriate for age.  PSYCH: Mentation appears normal, affect normal/bright, judgement and insight intact, normal speech and appearance well-groomed.         Counseling:   We reviewed the important post op bariatric recommendations:  -eating 3 meals daily  -eating protein first, getting >60gm protein daily  -eating slowly, chewing food well  -avoiding/limiting calorie containing beverages  -drinking water 15-30 minutes before or after meals  -choosing wheat, not white with breads, crackers, pastas, radha, bagels, tortillas, rice  -limiting restaurant or cafeteria eating to twice a week or less    We discussed the  importance of restorative sleep and stress management in maintaining a healthy weight.  We discussed the National Weight Control Registry healthy weight maintenance strategies and ways to optimize metabolism.  We discussed the importance of physical activity including cardiovascular and strength training in maintaining a healthier weight.  We discussed the importance of life-long vitamin supplementation and life-long  follow-up.    Keena was reminded that, to avoid marginal ulcers she should avoid tobacco at all, alcohol in excess, caffeine in excess, and NSAIDS (unless indicated for cardioprotection or othewise and opposed by a PPI).    ANTONIO Graham MD  [facility] Bariatric clinic  11/24/2021  6:18 AM            No images are attached to the encounter.        Again, thank you for allowing me to participate in the care of your patient.        Sincerely,        ANTONIO Graham MD

## 2021-12-08 ENCOUNTER — LAB REQUISITION (OUTPATIENT)
Dept: LAB | Facility: CLINIC | Age: 51
End: 2021-12-08

## 2021-12-08 DIAGNOSIS — E03.9 HYPOTHYROIDISM, UNSPECIFIED: ICD-10-CM

## 2021-12-08 LAB
T4 FREE SERPL-MCNC: 1.26 NG/DL (ref 0.7–1.8)
TSH SERPL DL<=0.005 MIU/L-ACNC: 0.19 UIU/ML (ref 0.3–5)

## 2021-12-08 PROCEDURE — 84439 ASSAY OF FREE THYROXINE: CPT | Performed by: PHYSICIAN ASSISTANT

## 2021-12-08 PROCEDURE — 84443 ASSAY THYROID STIM HORMONE: CPT | Performed by: PHYSICIAN ASSISTANT

## 2022-01-06 ENCOUNTER — LAB REQUISITION (OUTPATIENT)
Dept: LAB | Facility: CLINIC | Age: 52
End: 2022-01-06

## 2022-01-06 DIAGNOSIS — E03.9 HYPOTHYROIDISM, UNSPECIFIED: ICD-10-CM

## 2022-01-06 PROCEDURE — 84443 ASSAY THYROID STIM HORMONE: CPT | Performed by: PHYSICIAN ASSISTANT

## 2022-01-07 LAB — TSH SERPL DL<=0.005 MIU/L-ACNC: 2.14 UIU/ML (ref 0.3–5)

## 2022-01-14 ENCOUNTER — LAB REQUISITION (OUTPATIENT)
Dept: LAB | Facility: CLINIC | Age: 52
End: 2022-01-14

## 2022-01-14 PROCEDURE — U0003 INFECTIOUS AGENT DETECTION BY NUCLEIC ACID (DNA OR RNA); SEVERE ACUTE RESPIRATORY SYNDROME CORONAVIRUS 2 (SARS-COV-2) (CORONAVIRUS DISEASE [COVID-19]), AMPLIFIED PROBE TECHNIQUE, MAKING USE OF HIGH THROUGHPUT TECHNOLOGIES AS DESCRIBED BY CMS-2020-01-R: HCPCS | Performed by: FAMILY MEDICINE

## 2022-01-15 LAB — SARS-COV-2 RNA RESP QL NAA+PROBE: NEGATIVE

## 2022-01-20 ENCOUNTER — LAB REQUISITION (OUTPATIENT)
Dept: LAB | Facility: CLINIC | Age: 52
End: 2022-01-20

## 2022-01-20 DIAGNOSIS — U07.1 COVID-19: ICD-10-CM

## 2022-01-20 PROCEDURE — U0003 INFECTIOUS AGENT DETECTION BY NUCLEIC ACID (DNA OR RNA); SEVERE ACUTE RESPIRATORY SYNDROME CORONAVIRUS 2 (SARS-COV-2) (CORONAVIRUS DISEASE [COVID-19]), AMPLIFIED PROBE TECHNIQUE, MAKING USE OF HIGH THROUGHPUT TECHNOLOGIES AS DESCRIBED BY CMS-2020-01-R: HCPCS | Performed by: FAMILY MEDICINE

## 2022-01-21 LAB — SARS-COV-2 RNA RESP QL NAA+PROBE: NEGATIVE

## 2022-04-22 ENCOUNTER — LAB REQUISITION (OUTPATIENT)
Dept: LAB | Facility: CLINIC | Age: 52
End: 2022-04-22

## 2022-04-22 DIAGNOSIS — M79.10 MYALGIA, UNSPECIFIED SITE: ICD-10-CM

## 2022-04-22 LAB
ANION GAP SERPL CALCULATED.3IONS-SCNC: 11 MMOL/L (ref 5–18)
BUN SERPL-MCNC: 12 MG/DL (ref 8–22)
CALCIUM SERPL-MCNC: 9.2 MG/DL (ref 8.5–10.5)
CHLORIDE BLD-SCNC: 102 MMOL/L (ref 98–107)
CO2 SERPL-SCNC: 25 MMOL/L (ref 22–31)
CREAT SERPL-MCNC: 0.69 MG/DL (ref 0.6–1.1)
GFR SERPL CREATININE-BSD FRML MDRD: >90 ML/MIN/1.73M2
GLUCOSE BLD-MCNC: 80 MG/DL (ref 70–125)
MAGNESIUM SERPL-MCNC: 1.6 MG/DL (ref 1.8–2.6)
POTASSIUM BLD-SCNC: 4 MMOL/L (ref 3.5–5)
SODIUM SERPL-SCNC: 138 MMOL/L (ref 136–145)

## 2022-04-22 PROCEDURE — 80048 BASIC METABOLIC PNL TOTAL CA: CPT | Performed by: PHYSICIAN ASSISTANT

## 2022-04-22 PROCEDURE — 83735 ASSAY OF MAGNESIUM: CPT | Performed by: PHYSICIAN ASSISTANT

## 2022-06-27 ENCOUNTER — LAB REQUISITION (OUTPATIENT)
Dept: LAB | Facility: CLINIC | Age: 52
End: 2022-06-27

## 2022-06-27 DIAGNOSIS — E03.9 HYPOTHYROIDISM, UNSPECIFIED: ICD-10-CM

## 2022-06-27 LAB — TSH SERPL DL<=0.005 MIU/L-ACNC: 3.39 UIU/ML (ref 0.3–5)

## 2022-06-27 PROCEDURE — 84443 ASSAY THYROID STIM HORMONE: CPT | Performed by: PHYSICIAN ASSISTANT

## 2022-07-23 ENCOUNTER — HEALTH MAINTENANCE LETTER (OUTPATIENT)
Age: 52
End: 2022-07-23

## 2022-10-01 ENCOUNTER — HEALTH MAINTENANCE LETTER (OUTPATIENT)
Age: 52
End: 2022-10-01

## 2022-10-22 ENCOUNTER — LAB REQUISITION (OUTPATIENT)
Dept: LAB | Facility: CLINIC | Age: 52
End: 2022-10-22

## 2022-10-22 DIAGNOSIS — J02.9 ACUTE PHARYNGITIS, UNSPECIFIED: ICD-10-CM

## 2022-10-22 PROCEDURE — 87081 CULTURE SCREEN ONLY: CPT | Performed by: FAMILY MEDICINE

## 2022-10-24 LAB — BACTERIA SPEC CULT: NORMAL

## 2022-11-15 ENCOUNTER — LAB REQUISITION (OUTPATIENT)
Dept: LAB | Facility: CLINIC | Age: 52
End: 2022-11-15

## 2022-11-15 PROCEDURE — U0003 INFECTIOUS AGENT DETECTION BY NUCLEIC ACID (DNA OR RNA); SEVERE ACUTE RESPIRATORY SYNDROME CORONAVIRUS 2 (SARS-COV-2) (CORONAVIRUS DISEASE [COVID-19]), AMPLIFIED PROBE TECHNIQUE, MAKING USE OF HIGH THROUGHPUT TECHNOLOGIES AS DESCRIBED BY CMS-2020-01-R: HCPCS | Performed by: FAMILY MEDICINE

## 2022-11-16 LAB — SARS-COV-2 RNA RESP QL NAA+PROBE: NEGATIVE

## 2023-02-27 ENCOUNTER — LAB REQUISITION (OUTPATIENT)
Dept: LAB | Facility: CLINIC | Age: 53
End: 2023-02-27

## 2023-02-27 DIAGNOSIS — E55.9 VITAMIN D DEFICIENCY, UNSPECIFIED: ICD-10-CM

## 2023-02-27 DIAGNOSIS — E03.9 HYPOTHYROIDISM, UNSPECIFIED: ICD-10-CM

## 2023-02-27 DIAGNOSIS — Z13.6 ENCOUNTER FOR SCREENING FOR CARDIOVASCULAR DISORDERS: ICD-10-CM

## 2023-02-27 DIAGNOSIS — Z13.1 ENCOUNTER FOR SCREENING FOR DIABETES MELLITUS: ICD-10-CM

## 2023-02-27 LAB
ALBUMIN SERPL BCG-MCNC: 4.2 G/DL (ref 3.5–5.2)
ALP SERPL-CCNC: 42 U/L (ref 35–104)
ALT SERPL W P-5'-P-CCNC: 13 U/L (ref 10–35)
ANION GAP SERPL CALCULATED.3IONS-SCNC: 13 MMOL/L (ref 7–15)
AST SERPL W P-5'-P-CCNC: 16 U/L (ref 10–35)
BILIRUB SERPL-MCNC: 0.2 MG/DL
BUN SERPL-MCNC: 9 MG/DL (ref 6–20)
CALCIUM SERPL-MCNC: 9.1 MG/DL (ref 8.6–10)
CHLORIDE SERPL-SCNC: 104 MMOL/L (ref 98–107)
CHOLEST SERPL-MCNC: 188 MG/DL
CREAT SERPL-MCNC: 0.62 MG/DL (ref 0.51–0.95)
DEPRECATED HCO3 PLAS-SCNC: 22 MMOL/L (ref 22–29)
FERRITIN SERPL-MCNC: 45 NG/ML (ref 11–328)
GFR SERPL CREATININE-BSD FRML MDRD: >90 ML/MIN/1.73M2
GLUCOSE SERPL-MCNC: 92 MG/DL (ref 70–99)
HDLC SERPL-MCNC: 65 MG/DL
IRON BINDING CAPACITY (ROCHE): 335 UG/DL (ref 240–430)
IRON SATN MFR SERPL: 15 % (ref 15–46)
IRON SERPL-MCNC: 51 UG/DL (ref 37–145)
LDLC SERPL CALC-MCNC: 109 MG/DL
MAGNESIUM SERPL-MCNC: 2 MG/DL (ref 1.7–2.3)
NONHDLC SERPL-MCNC: 123 MG/DL
POTASSIUM SERPL-SCNC: 3.9 MMOL/L (ref 3.4–5.3)
PROT SERPL-MCNC: 7 G/DL (ref 6.4–8.3)
SODIUM SERPL-SCNC: 139 MMOL/L (ref 136–145)
TRIGL SERPL-MCNC: 68 MG/DL
TSH SERPL DL<=0.005 MIU/L-ACNC: 3.65 UIU/ML (ref 0.3–4.2)
VIT B12 SERPL-MCNC: 2669 PG/ML (ref 232–1245)

## 2023-02-27 PROCEDURE — 80053 COMPREHEN METABOLIC PANEL: CPT | Performed by: PHYSICIAN ASSISTANT

## 2023-02-27 PROCEDURE — G0145 SCR C/V CYTO,THINLAYER,RESCR: HCPCS | Performed by: PHYSICIAN ASSISTANT

## 2023-02-27 PROCEDURE — 82607 VITAMIN B-12: CPT | Performed by: PHYSICIAN ASSISTANT

## 2023-02-27 PROCEDURE — 84443 ASSAY THYROID STIM HORMONE: CPT | Performed by: PHYSICIAN ASSISTANT

## 2023-02-27 PROCEDURE — 82306 VITAMIN D 25 HYDROXY: CPT | Performed by: PHYSICIAN ASSISTANT

## 2023-02-27 PROCEDURE — 83550 IRON BINDING TEST: CPT | Performed by: PHYSICIAN ASSISTANT

## 2023-02-27 PROCEDURE — 82728 ASSAY OF FERRITIN: CPT | Performed by: PHYSICIAN ASSISTANT

## 2023-02-27 PROCEDURE — 87624 HPV HI-RISK TYP POOLED RSLT: CPT | Performed by: PHYSICIAN ASSISTANT

## 2023-02-27 PROCEDURE — 80061 LIPID PANEL: CPT | Performed by: PHYSICIAN ASSISTANT

## 2023-02-27 PROCEDURE — 83735 ASSAY OF MAGNESIUM: CPT | Performed by: PHYSICIAN ASSISTANT

## 2023-02-28 LAB — DEPRECATED CALCIDIOL+CALCIFEROL SERPL-MC: 77 UG/L (ref 20–75)

## 2023-03-01 LAB
BKR LAB AP GYN ADEQUACY: NORMAL
BKR LAB AP GYN INTERPRETATION: NORMAL
BKR LAB AP HPV REFLEX: NORMAL
BKR LAB AP PREVIOUS ABNORMAL: NORMAL
PATH REPORT.COMMENTS IMP SPEC: NORMAL
PATH REPORT.COMMENTS IMP SPEC: NORMAL
PATH REPORT.RELEVANT HX SPEC: NORMAL

## 2023-03-02 LAB
HUMAN PAPILLOMA VIRUS 16 DNA: NEGATIVE
HUMAN PAPILLOMA VIRUS 18 DNA: NEGATIVE
HUMAN PAPILLOMA VIRUS FINAL DIAGNOSIS: NORMAL
HUMAN PAPILLOMA VIRUS OTHER HR: NEGATIVE

## 2023-05-03 ENCOUNTER — LAB REQUISITION (OUTPATIENT)
Dept: LAB | Facility: CLINIC | Age: 53
End: 2023-05-03
Payer: COMMERCIAL

## 2023-05-03 DIAGNOSIS — N93.9 ABNORMAL UTERINE AND VAGINAL BLEEDING, UNSPECIFIED: ICD-10-CM

## 2023-05-03 PROCEDURE — 88305 TISSUE EXAM BY PATHOLOGIST: CPT | Mod: 26 | Performed by: PATHOLOGY

## 2023-05-03 PROCEDURE — 88305 TISSUE EXAM BY PATHOLOGIST: CPT | Mod: TC,ORL | Performed by: OBSTETRICS & GYNECOLOGY

## 2023-05-04 ENCOUNTER — LAB REQUISITION (OUTPATIENT)
Dept: LAB | Facility: CLINIC | Age: 53
End: 2023-05-04

## 2023-05-04 DIAGNOSIS — E55.9 VITAMIN D DEFICIENCY, UNSPECIFIED: ICD-10-CM

## 2023-05-04 DIAGNOSIS — R74.8 ABNORMAL LEVELS OF OTHER SERUM ENZYMES: ICD-10-CM

## 2023-05-04 LAB — VIT B12 SERPL-MCNC: 1205 PG/ML (ref 232–1245)

## 2023-05-04 PROCEDURE — 82306 VITAMIN D 25 HYDROXY: CPT | Performed by: PHYSICIAN ASSISTANT

## 2023-05-04 PROCEDURE — 82607 VITAMIN B-12: CPT | Performed by: PHYSICIAN ASSISTANT

## 2023-05-05 LAB — DEPRECATED CALCIDIOL+CALCIFEROL SERPL-MC: 72 UG/L (ref 20–75)

## 2023-05-08 LAB
PATH REPORT.COMMENTS IMP SPEC: NORMAL
PATH REPORT.COMMENTS IMP SPEC: NORMAL
PATH REPORT.FINAL DX SPEC: NORMAL
PATH REPORT.GROSS SPEC: NORMAL
PATH REPORT.MICROSCOPIC SPEC OTHER STN: NORMAL
PATH REPORT.RELEVANT HX SPEC: NORMAL
PHOTO IMAGE: NORMAL

## 2023-08-12 ENCOUNTER — HEALTH MAINTENANCE LETTER (OUTPATIENT)
Age: 53
End: 2023-08-12

## 2023-10-13 ENCOUNTER — LAB REQUISITION (OUTPATIENT)
Dept: LAB | Facility: CLINIC | Age: 53
End: 2023-10-13

## 2023-10-13 DIAGNOSIS — R10.9 UNSPECIFIED ABDOMINAL PAIN: ICD-10-CM

## 2023-10-13 DIAGNOSIS — R10.11 RIGHT UPPER QUADRANT PAIN: ICD-10-CM

## 2023-10-13 PROCEDURE — 82150 ASSAY OF AMYLASE: CPT | Performed by: PHYSICIAN ASSISTANT

## 2023-10-13 PROCEDURE — 80053 COMPREHEN METABOLIC PANEL: CPT | Performed by: PHYSICIAN ASSISTANT

## 2023-10-13 PROCEDURE — 87086 URINE CULTURE/COLONY COUNT: CPT | Performed by: PHYSICIAN ASSISTANT

## 2023-10-13 PROCEDURE — 83690 ASSAY OF LIPASE: CPT | Performed by: PHYSICIAN ASSISTANT

## 2023-10-14 LAB
ALBUMIN SERPL BCG-MCNC: 4.4 G/DL (ref 3.5–5.2)
ALP SERPL-CCNC: 53 U/L (ref 35–104)
ALT SERPL W P-5'-P-CCNC: 12 U/L (ref 0–50)
AMYLASE SERPL-CCNC: 52 U/L (ref 28–100)
ANION GAP SERPL CALCULATED.3IONS-SCNC: 12 MMOL/L (ref 7–15)
AST SERPL W P-5'-P-CCNC: 17 U/L (ref 0–45)
BILIRUB SERPL-MCNC: 0.4 MG/DL
BUN SERPL-MCNC: 10.4 MG/DL (ref 6–20)
CALCIUM SERPL-MCNC: 9.3 MG/DL (ref 8.6–10)
CHLORIDE SERPL-SCNC: 102 MMOL/L (ref 98–107)
CREAT SERPL-MCNC: 0.74 MG/DL (ref 0.51–0.95)
DEPRECATED HCO3 PLAS-SCNC: 24 MMOL/L (ref 22–29)
EGFRCR SERPLBLD CKD-EPI 2021: >90 ML/MIN/1.73M2
GLUCOSE SERPL-MCNC: 87 MG/DL (ref 70–99)
LIPASE SERPL-CCNC: 29 U/L (ref 13–60)
POTASSIUM SERPL-SCNC: 4.1 MMOL/L (ref 3.4–5.3)
PROT SERPL-MCNC: 7.6 G/DL (ref 6.4–8.3)
SODIUM SERPL-SCNC: 138 MMOL/L (ref 135–145)

## 2023-10-15 LAB — BACTERIA UR CULT: NORMAL

## 2023-11-08 NOTE — PROGRESS NOTES
Tasklist updated.    Jacquelyn Quezada RN, CBN  [facility]  P [phone]  F [phone]     no

## 2024-02-28 ENCOUNTER — LAB REQUISITION (OUTPATIENT)
Dept: LAB | Facility: CLINIC | Age: 54
End: 2024-02-28

## 2024-02-28 DIAGNOSIS — E55.9 VITAMIN D DEFICIENCY, UNSPECIFIED: ICD-10-CM

## 2024-02-28 DIAGNOSIS — E03.9 HYPOTHYROIDISM, UNSPECIFIED: ICD-10-CM

## 2024-02-28 DIAGNOSIS — E53.8 DEFICIENCY OF OTHER SPECIFIED B GROUP VITAMINS: ICD-10-CM

## 2024-02-28 DIAGNOSIS — Z13.6 ENCOUNTER FOR SCREENING FOR CARDIOVASCULAR DISORDERS: ICD-10-CM

## 2024-02-28 PROCEDURE — 80061 LIPID PANEL: CPT | Performed by: PHYSICIAN ASSISTANT

## 2024-02-28 PROCEDURE — 82306 VITAMIN D 25 HYDROXY: CPT | Performed by: PHYSICIAN ASSISTANT

## 2024-02-28 PROCEDURE — 80048 BASIC METABOLIC PNL TOTAL CA: CPT | Performed by: PHYSICIAN ASSISTANT

## 2024-02-28 PROCEDURE — 82607 VITAMIN B-12: CPT | Performed by: PHYSICIAN ASSISTANT

## 2024-02-28 PROCEDURE — 84443 ASSAY THYROID STIM HORMONE: CPT | Performed by: PHYSICIAN ASSISTANT

## 2024-02-29 LAB
ANION GAP SERPL CALCULATED.3IONS-SCNC: 11 MMOL/L (ref 7–15)
BUN SERPL-MCNC: 7.3 MG/DL (ref 6–20)
CALCIUM SERPL-MCNC: 9.2 MG/DL (ref 8.6–10)
CHLORIDE SERPL-SCNC: 101 MMOL/L (ref 98–107)
CHOLEST SERPL-MCNC: 192 MG/DL
CREAT SERPL-MCNC: 0.68 MG/DL (ref 0.51–0.95)
DEPRECATED HCO3 PLAS-SCNC: 25 MMOL/L (ref 22–29)
EGFRCR SERPLBLD CKD-EPI 2021: >90 ML/MIN/1.73M2
FASTING STATUS PATIENT QL REPORTED: ABNORMAL
GLUCOSE SERPL-MCNC: 83 MG/DL (ref 70–99)
HDLC SERPL-MCNC: 68 MG/DL
LDLC SERPL CALC-MCNC: 112 MG/DL
NONHDLC SERPL-MCNC: 124 MG/DL
POTASSIUM SERPL-SCNC: 4 MMOL/L (ref 3.4–5.3)
SODIUM SERPL-SCNC: 137 MMOL/L (ref 135–145)
TRIGL SERPL-MCNC: 58 MG/DL
TSH SERPL DL<=0.005 MIU/L-ACNC: 0.95 UIU/ML (ref 0.3–4.2)
VIT B12 SERPL-MCNC: 1356 PG/ML (ref 232–1245)
VIT D+METAB SERPL-MCNC: 64 NG/ML (ref 20–50)

## 2024-07-03 ENCOUNTER — LAB REQUISITION (OUTPATIENT)
Dept: LAB | Facility: CLINIC | Age: 54
End: 2024-07-03

## 2024-07-03 DIAGNOSIS — E03.9 HYPOTHYROIDISM, UNSPECIFIED: ICD-10-CM

## 2024-07-03 PROCEDURE — 84443 ASSAY THYROID STIM HORMONE: CPT | Performed by: PHYSICIAN ASSISTANT

## 2024-07-03 PROCEDURE — 84439 ASSAY OF FREE THYROXINE: CPT | Performed by: PHYSICIAN ASSISTANT

## 2024-07-04 LAB
T4 FREE SERPL-MCNC: 1.75 NG/DL (ref 0.9–1.7)
TSH SERPL DL<=0.005 MIU/L-ACNC: 0.18 UIU/ML (ref 0.3–4.2)

## 2024-09-11 ENCOUNTER — LAB REQUISITION (OUTPATIENT)
Dept: LAB | Facility: CLINIC | Age: 54
End: 2024-09-11

## 2024-09-11 DIAGNOSIS — E03.9 HYPOTHYROIDISM, UNSPECIFIED: ICD-10-CM

## 2024-09-11 PROCEDURE — 84439 ASSAY OF FREE THYROXINE: CPT | Performed by: PHYSICIAN ASSISTANT

## 2024-09-11 PROCEDURE — 84443 ASSAY THYROID STIM HORMONE: CPT | Performed by: PHYSICIAN ASSISTANT

## 2024-09-12 LAB
T4 FREE SERPL-MCNC: 1.34 NG/DL (ref 0.9–1.7)
TSH SERPL DL<=0.005 MIU/L-ACNC: 8.55 UIU/ML (ref 0.3–4.2)

## 2024-09-29 ENCOUNTER — HEALTH MAINTENANCE LETTER (OUTPATIENT)
Age: 54
End: 2024-09-29

## 2024-10-28 ENCOUNTER — LAB REQUISITION (OUTPATIENT)
Dept: LAB | Facility: CLINIC | Age: 54
End: 2024-10-28

## 2024-10-28 DIAGNOSIS — E03.9 HYPOTHYROIDISM, UNSPECIFIED: ICD-10-CM

## 2024-10-28 PROCEDURE — 84443 ASSAY THYROID STIM HORMONE: CPT | Performed by: PHYSICIAN ASSISTANT

## 2024-10-29 LAB — TSH SERPL DL<=0.005 MIU/L-ACNC: 2.54 UIU/ML (ref 0.3–4.2)

## 2025-05-14 ENCOUNTER — LAB REQUISITION (OUTPATIENT)
Dept: LAB | Facility: CLINIC | Age: 55
End: 2025-05-14

## 2025-05-14 DIAGNOSIS — E03.9 HYPOTHYROIDISM, UNSPECIFIED: ICD-10-CM

## 2025-05-14 PROCEDURE — 84443 ASSAY THYROID STIM HORMONE: CPT | Performed by: PHYSICIAN ASSISTANT

## 2025-05-15 LAB — TSH SERPL DL<=0.005 MIU/L-ACNC: 0.91 UIU/ML (ref 0.3–4.2)

## 2025-07-08 ENCOUNTER — LAB REQUISITION (OUTPATIENT)
Dept: LAB | Facility: CLINIC | Age: 55
End: 2025-07-08

## 2025-07-08 DIAGNOSIS — E03.9 HYPOTHYROIDISM, UNSPECIFIED: ICD-10-CM

## 2025-07-08 DIAGNOSIS — R53.83 OTHER FATIGUE: ICD-10-CM

## 2025-07-08 PROCEDURE — 84443 ASSAY THYROID STIM HORMONE: CPT | Performed by: PHYSICIAN ASSISTANT

## 2025-07-09 LAB — TSH SERPL DL<=0.005 MIU/L-ACNC: 1.06 UIU/ML (ref 0.3–4.2)
